# Patient Record
Sex: MALE | Race: WHITE | Employment: FULL TIME | URBAN - METROPOLITAN AREA
[De-identification: names, ages, dates, MRNs, and addresses within clinical notes are randomized per-mention and may not be internally consistent; named-entity substitution may affect disease eponyms.]

---

## 2022-03-19 PROBLEM — R33.9 URINARY RETENTION: Status: ACTIVE | Noted: 2021-04-30

## 2022-03-19 PROBLEM — F17.200 TOBACCO DEPENDENCE: Status: ACTIVE | Noted: 2021-04-30

## 2022-03-19 PROBLEM — D17.9 MULTIPLE LIPOMAS: Status: ACTIVE | Noted: 2021-04-30

## 2022-03-19 PROBLEM — Z87.448 HISTORY OF URETHRAL STRICTURE: Status: ACTIVE | Noted: 2021-04-30

## 2022-09-08 ENCOUNTER — CLINICAL DOCUMENTATION (OUTPATIENT)
Dept: ORTHOPEDIC SURGERY | Age: 62
End: 2022-09-08

## 2022-09-12 ENCOUNTER — CLINICAL DOCUMENTATION (OUTPATIENT)
Dept: ORTHOPEDIC SURGERY | Age: 62
End: 2022-09-12

## 2022-09-12 NOTE — PROGRESS NOTES
REVD AND SAID PATIENT PROBABLY NEEDS TO SEE TOTAL JOINTS BECAUSE HE HAS DJD. I SENT THE RECORDS TO DR. NATH TO SEE IF HE WILL SEE.

## 2022-10-03 ENCOUNTER — OFFICE VISIT (OUTPATIENT)
Dept: ORTHOPEDIC SURGERY | Age: 62
End: 2022-10-03

## 2022-10-03 DIAGNOSIS — S80.01XA CONTUSION OF RIGHT KNEE, INITIAL ENCOUNTER: ICD-10-CM

## 2022-10-03 DIAGNOSIS — M17.11 LOCALIZED OSTEOARTHRITIS OF RIGHT KNEE: Primary | ICD-10-CM

## 2022-10-03 PROBLEM — S80.00XA CONTUSION OF KNEE: Status: ACTIVE | Noted: 2022-10-03

## 2022-10-03 NOTE — PROGRESS NOTES
Name: Ajay Nesbitt  YOB: 1960  Gender: male  MRN: [id number]    CC:   Chief Complaint   Patient presents with    Knee Pain     NP WC Right knee pain          HPI:   The pain has been as now since his fell on June 6 where he suffered direct blow to his right knee. He was seen by few doctors and an MR procured. He was referred here for further evaluation. It hurts particularly at night when sleeping. The pain is located over the knee. It does substantially hurt to walk and gets worse with increased distances. The pain does not radiate down the leg. Numbness and tingling are not noted. Treatment so far has been nothing. Video was shown revealing the fall as well as his effusion demonstrated. No current outpatient medications on file. No Known Allergies  No past medical history on file. Malik Nguyen  Past Surgical History:   Procedure Laterality Date    HERNIA REPAIR      umbilical    LIPOMA RESECTION      TONSILLECTOMY      UROLOGICAL      stricture     Family History   Problem Relation Age of Onset    Colon Cancer Neg Hx     Prostate Cancer Neg Hx      Social History     Socioeconomic History    Marital status:      Spouse name: Not on file    Number of children: Not on file    Years of education: Not on file    Highest education level: Not on file   Occupational History    Not on file   Tobacco Use    Smoking status: Some Days    Smokeless tobacco: Never    Tobacco comments:     Quit smoking: smokes one cigar a day   Substance and Sexual Activity    Alcohol use:  Yes     Alcohol/week: 10.0 standard drinks    Drug use: Not Currently    Sexual activity: Not on file   Other Topics Concern    Not on file   Social History Narrative    Not on file     Social Determinants of Health     Financial Resource Strain: Not on file   Food Insecurity: Not on file   Transportation Needs: Not on file   Physical Activity: Not on file   Stress: Not on file   Social Connections: Not on file   Intimate Partner Violence: Not on file   Housing Stability: Not on file       Review of Systems:  As per HPI. Pertinent positives and negatives are addressed with the patient, particularly those related to musculoskeletal concerns. Non-orthopaedic concerns were referred back to the primary care physician. PHYSICAL EXAMINATION:   The patient appears their stated age and they are in no distress. The lower extremities are as described below. Circulation is normal with palpable pedal pulses bilaterally and no edema. There is no lymph adenopathy in the popliteal or malleolar region. The skin is without stasis disease distally bilaterally. Hip ROM is smooth and painless. Knee range of motion is 0-120 degrees on the right and 0-130 degrees on the left.  right knee: There is 2mm of anterior/posterior translation and 2mm of medial/lateral instability bilaterally. There is 0 degrees of varus alignment in the right knee. There is no noted pain to palpation over the medial joint line. Is a 1+ effusion about the right knee  Limb lengths are equal.  The gait is noted to be with a slight trendelenburg and antalgia. Straight leg test is negative. Quadriceps strength is good. Sensation is intact to light touch bilaterally. Their judgment and insight are normal.  They are oriented to time, place and person. Their memory is good and the mood and affect appropriate. X-RAY: Views of the right knee are reviewed. 4 views standing reveal no appreciable joint space narrowing, eburnation, or osteophyte formation. X-ray impression:  Unremarkable knee joint    His MR reveals some chondral thinning of the patella. He has had some periarticular edema that was noted. Some strain to the ligaments. He has degeneration posterior horn. IMPRESSION:    Diagnosis Orders   1. Localized osteoarthritis of right knee  XR KNEE RIGHT (MIN 4 VIEWS)      . RECOMMENDATIONS:    Reviewed x-ray findings with the patient.  Today we discussed conservative treatments such a cortisone injection she may allow subsidence of the small effusion that remains in his discomfort. I think he had exacerbation of some pre-existing degenerative changes when he fell. I do not see any surgical indication here. Occasional Advil or Aleve could be utilized. He has continued to return to working with activities things continue to subside. Though he had exacerbation of a pre-existing condition no treatment is warranted at this time. Approximately 35 minutes was spent reviewing the medical record, imaging, performing history and physical examination, discussing the diagnosis and treatment plan with the patient, and completing documentation for this visit.

## 2023-01-10 ENCOUNTER — OFFICE VISIT (OUTPATIENT)
Dept: FAMILY MEDICINE CLINIC | Facility: CLINIC | Age: 63
End: 2023-01-10
Payer: COMMERCIAL

## 2023-01-10 VITALS
BODY MASS INDEX: 29.66 KG/M2 | DIASTOLIC BLOOD PRESSURE: 80 MMHG | SYSTOLIC BLOOD PRESSURE: 120 MMHG | HEART RATE: 61 BPM | HEIGHT: 72 IN | WEIGHT: 219 LBS | OXYGEN SATURATION: 96 %

## 2023-01-10 DIAGNOSIS — J20.9 ACUTE BRONCHITIS, UNSPECIFIED ORGANISM: ICD-10-CM

## 2023-01-10 DIAGNOSIS — M25.511 CHRONIC PAIN OF BOTH SHOULDERS: ICD-10-CM

## 2023-01-10 DIAGNOSIS — G89.29 CHRONIC PAIN OF BOTH SHOULDERS: ICD-10-CM

## 2023-01-10 DIAGNOSIS — M25.512 CHRONIC PAIN OF BOTH SHOULDERS: ICD-10-CM

## 2023-01-10 DIAGNOSIS — F17.200 TOBACCO DEPENDENCE: ICD-10-CM

## 2023-01-10 DIAGNOSIS — N52.9 ERECTILE DYSFUNCTION, UNSPECIFIED ERECTILE DYSFUNCTION TYPE: ICD-10-CM

## 2023-01-10 DIAGNOSIS — Z00.01 ENCOUNTER FOR ROUTINE ADULT HEALTH EXAMINATION WITH ABNORMAL FINDINGS: Primary | ICD-10-CM

## 2023-01-10 DIAGNOSIS — R33.9 URINARY RETENTION: ICD-10-CM

## 2023-01-10 DIAGNOSIS — F51.04 PSYCHOPHYSIOLOGICAL INSOMNIA: ICD-10-CM

## 2023-01-10 DIAGNOSIS — Z23 ENCOUNTER FOR IMMUNIZATION: ICD-10-CM

## 2023-01-10 PROCEDURE — 90471 IMMUNIZATION ADMIN: CPT | Performed by: FAMILY MEDICINE

## 2023-01-10 PROCEDURE — 99396 PREV VISIT EST AGE 40-64: CPT | Performed by: FAMILY MEDICINE

## 2023-01-10 PROCEDURE — 90732 PPSV23 VACC 2 YRS+ SUBQ/IM: CPT | Performed by: FAMILY MEDICINE

## 2023-01-10 RX ORDER — ALBUTEROL SULFATE 90 UG/1
2 AEROSOL, METERED RESPIRATORY (INHALATION) 4 TIMES DAILY PRN
Qty: 18 G | Refills: 2 | Status: SHIPPED | OUTPATIENT
Start: 2023-01-10

## 2023-01-10 RX ORDER — TRAZODONE HYDROCHLORIDE 50 MG/1
50 TABLET ORAL NIGHTLY
Qty: 90 TABLET | Refills: 1 | Status: SHIPPED | OUTPATIENT
Start: 2023-01-10

## 2023-01-10 RX ORDER — SILDENAFIL 50 MG/1
50 TABLET, FILM COATED ORAL PRN
Qty: 30 TABLET | Refills: 3 | Status: SHIPPED | OUTPATIENT
Start: 2023-01-10

## 2023-01-10 ASSESSMENT — PATIENT HEALTH QUESTIONNAIRE - PHQ9
SUM OF ALL RESPONSES TO PHQ QUESTIONS 1-9: 0
SUM OF ALL RESPONSES TO PHQ QUESTIONS 1-9: 0
2. FEELING DOWN, DEPRESSED OR HOPELESS: 0
SUM OF ALL RESPONSES TO PHQ QUESTIONS 1-9: 0
SUM OF ALL RESPONSES TO PHQ QUESTIONS 1-9: 0
1. LITTLE INTEREST OR PLEASURE IN DOING THINGS: 0
SUM OF ALL RESPONSES TO PHQ9 QUESTIONS 1 & 2: 0

## 2023-01-10 ASSESSMENT — ENCOUNTER SYMPTOMS
WHEEZING: 0
DIARRHEA: 0
SHORTNESS OF BREATH: 0
ANAL BLEEDING: 0
CHEST TIGHTNESS: 0
CONSTIPATION: 0
ABDOMINAL PAIN: 0

## 2023-01-10 NOTE — PROGRESS NOTES
Porfirio Stone is a 63 y.o. male who presents today for the following:  Chief Complaint   Patient presents with    Annual Exam       No Known Allergies    No current outpatient medications on file. No current facility-administered medications for this visit. Past Medical History:   Diagnosis Date    Anxiety 01/09/2021    Erectile dysfunction 02/14/2019    Osteoarthritis 06/10/2022    Right Knee       Past Surgical History:   Procedure Laterality Date    HERNIA REPAIR      umbilical    LIPOMA RESECTION      TONSILLECTOMY      UROLOGICAL SURGERY      stricture       Social History     Tobacco Use    Smoking status: Some Days     Types: Cigars    Smokeless tobacco: Never    Tobacco comments:     Quit smoking: smokes one cigar a day   Substance Use Topics    Alcohol use: Yes        Family History   Problem Relation Age of Onset    Arthritis Mother     High Blood Pressure Mother     Diabetes Father     Colon Cancer Neg Hx     Prostate Cancer Neg Hx        Patient is here today for annual physical.  Patient has the following chronic diseases:  Urinary retention: Patient reports history of urinary retention. He currently self catheterizes 2 times a week. Uses Magic 3 go 16 [place]. 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 Acute concern today regarding stiffness in shoulders. Also reports increased anxiety for past month. Reports mind is racing. Easily agitated and overwhelmed. Reports feels more relaxed once completes tasks. Reports falls asleep okay but wakes up after 3 hours. Specialists:  Optometrist [facility]      Colonoscopy: none.   [place] 2021  Prostate cancer screening:     Tobacco: smokes a cigar a day  Alcohol: 3 bryan [employer] a day  Drug: none    Immunizations:  COVID: 5 doses  FLU: completed  TDAP: 2015  PNA: agrees to receive today  SHINGRIX: completed at [place] [id number] in [facility]    DIET:  EXERCISE:        Review of Systems   Constitutional:  Negative for appetite change, fatigue and unexpected weight change. Respiratory:  Negative for chest tightness, shortness of breath and wheezing. Cardiovascular:  Negative for chest pain and palpitations. Gastrointestinal:  Negative for abdominal pain, anal bleeding, constipation and diarrhea. Genitourinary:  Positive for difficulty urinating. Skin:  Negative for rash. Neurological:  Negative for dizziness and light-headedness. Psychiatric/Behavioral:  Positive for sleep disturbance. The patient is nervous/anxious. /80   Pulse 61   Ht 6' (1.829 m)   Wt 219 lb (99.3 kg)   SpO2 96%   BMI 29.70 kg/m²     Physical Exam  Constitutional:       Appearance: Normal appearance. Eyes:      General: No scleral icterus. Conjunctiva/sclera: Conjunctivae normal.   Cardiovascular:      Rate and Rhythm: Normal rate and regular rhythm. Heart sounds: Normal heart sounds. No murmur heard. Pulmonary:      Effort: Pulmonary effort is normal. No respiratory distress. Breath sounds: Wheezing present. Musculoskeletal:      Cervical back: Neck supple. Lymphadenopathy:      Cervical: No cervical adenopathy. Skin:     Findings: No rash. Comments: Multiple lipomas    Neurological:      General: No focal deficit present. Mental Status: He is alert and oriented to person, place, and time. Psychiatric:      Comments: anxious          1. Encounter for routine adult health examination with abnormal findings  -     CBC with Auto Differential; Future  -     Lipid Panel; Future  2. Urinary retention  Assessment & Plan:   Referral placed again to urology. Continue with intermittent catheterization. Follow up on PSA and UA results. Orders:  -     Urinalysis; Future  -     PSA, Diagnostic; Future  -     [facility], [place]  3. Acute bronchitis, unspecified organism  -     albuterol sulfate HFA (VENTOLIN HFA) 108 (90 Base) MCG/ACT inhaler;  Inhale 2 puffs into the lungs 4 times daily as needed for Wheezing, Disp-18 g, R-2Normal  4. Psychophysiological insomnia  Assessment & Plan:   -Likely related to increase stress and anxiety. Trial of trazodone. Orders:  -     TSH with Reflex; Future  -     traZODone (DESYREL) 50 MG tablet; Take 1 tablet by mouth nightly, Disp-90 tablet, R-1Normal  5. Chronic pain of both shoulders  Decreased range of motion in shoulders. Offered referral to orthopedics but patient is not interested at this time. Could add on meloxicam if renal function is normal.  6. Erectile dysfunction, unspecified erectile dysfunction type  -     sildenafil (VIAGRA) 50 MG tablet; Take 1 tablet by mouth as needed for Erectile Dysfunction, Disp-30 tablet, R-3Normal  7. Encounter for immunization  -     Pneumococcal, PPSV23, PNEUMOVAX 21, (age 2 yrs+), SC/IM  8. Tobacco dependence  Assessment & Plan:   -Wheezing on exam today. Reports recent URI  -Currently smoking a cigar daily. Patient is not interested in stopping at this time. Patient informed, we will call with blood work results within one week. If you have not heard regarding results in over a week, please contact office. You can also review results on Mobile [facility].            Juana Menendez MD

## 2023-01-11 PROBLEM — F51.04 PSYCHOPHYSIOLOGICAL INSOMNIA: Status: ACTIVE | Noted: 2023-01-11

## 2023-01-11 NOTE — ASSESSMENT & PLAN NOTE
Referral placed again to urology. Continue with intermittent catheterization. Follow up on PSA and UA results.

## 2023-01-11 NOTE — ASSESSMENT & PLAN NOTE
-Wheezing on exam today. Reports recent URI  -Currently smoking a cigar daily. Patient is not interested in stopping at this time.

## 2023-01-17 ENCOUNTER — NURSE ONLY (OUTPATIENT)
Dept: FAMILY MEDICINE CLINIC | Facility: CLINIC | Age: 63
End: 2023-01-17

## 2023-01-17 DIAGNOSIS — R33.9 URINARY RETENTION: ICD-10-CM

## 2023-01-17 DIAGNOSIS — Z00.01 ENCOUNTER FOR ROUTINE ADULT HEALTH EXAMINATION WITH ABNORMAL FINDINGS: ICD-10-CM

## 2023-01-17 DIAGNOSIS — F51.04 PSYCHOPHYSIOLOGICAL INSOMNIA: ICD-10-CM

## 2023-01-17 LAB
BASOPHILS # BLD: 0.1 K/UL (ref 0–0.2)
BASOPHILS NFR BLD: 1 % (ref 0–2)
DIFFERENTIAL METHOD BLD: ABNORMAL
EOSINOPHIL # BLD: 0.4 K/UL (ref 0–0.8)
EOSINOPHIL NFR BLD: 4 % (ref 0.5–7.8)
ERYTHROCYTE [DISTWIDTH] IN BLOOD BY AUTOMATED COUNT: 13.8 % (ref 11.9–14.6)
HCT VFR BLD AUTO: 43.6 % (ref 41.1–50.3)
HGB BLD-MCNC: 13.6 G/DL (ref 13.6–17.2)
IMM GRANULOCYTES # BLD AUTO: 0 K/UL (ref 0–0.5)
IMM GRANULOCYTES NFR BLD AUTO: 0 % (ref 0–5)
LYMPHOCYTES # BLD: 2.9 K/UL (ref 0.5–4.6)
LYMPHOCYTES NFR BLD: 34 % (ref 13–44)
MCH RBC QN AUTO: 29.8 PG (ref 26.1–32.9)
MCHC RBC AUTO-ENTMCNC: 31.2 G/DL (ref 31.4–35)
MCV RBC AUTO: 95.4 FL (ref 82–102)
MONOCYTES # BLD: 0.5 K/UL (ref 0.1–1.3)
MONOCYTES NFR BLD: 5 % (ref 4–12)
NEUTS SEG # BLD: 4.9 K/UL (ref 1.7–8.2)
NEUTS SEG NFR BLD: 56 % (ref 43–78)
NRBC # BLD: 0 K/UL (ref 0–0.2)
PLATELET # BLD AUTO: 256 K/UL (ref 150–450)
PMV BLD AUTO: 9.9 FL (ref 9.4–12.3)
RBC # BLD AUTO: 4.57 M/UL (ref 4.23–5.6)
WBC # BLD AUTO: 8.7 K/UL (ref 4.3–11.1)

## 2023-01-18 LAB
APPEARANCE UR: ABNORMAL
BACTERIA URNS QL MICRO: ABNORMAL /HPF
BILIRUB UR QL: NEGATIVE
CHOLEST SERPL-MCNC: 225 MG/DL
COLOR UR: ABNORMAL
EPI CELLS #/AREA URNS HPF: ABNORMAL /HPF
GLUCOSE UR STRIP.AUTO-MCNC: NEGATIVE MG/DL
HDLC SERPL-MCNC: 70 MG/DL (ref 40–60)
HDLC SERPL: 3.2
HGB UR QL STRIP: ABNORMAL
KETONES UR QL STRIP.AUTO: NEGATIVE MG/DL
LDLC SERPL CALC-MCNC: 118.4 MG/DL
LEUKOCYTE ESTERASE UR QL STRIP.AUTO: ABNORMAL
NITRITE UR QL STRIP.AUTO: POSITIVE
OTHER OBSERVATIONS: ABNORMAL
PH UR STRIP: 5.5 (ref 5–9)
PROT UR STRIP-MCNC: NEGATIVE MG/DL
PSA SERPL-MCNC: 0.3 NG/ML
SP GR UR REFRACTOMETRY: 1.02 (ref 1–1.02)
TRIGL SERPL-MCNC: 183 MG/DL (ref 35–150)
TSH W FREE THYROID IF ABNORMAL: 1.41 UIU/ML (ref 0.36–3.74)
UROBILINOGEN UR QL STRIP.AUTO: 0.2 EU/DL (ref 0.2–1)
VLDLC SERPL CALC-MCNC: 36.6 MG/DL (ref 6–23)
WBC URNS QL MICRO: ABNORMAL /HPF

## 2023-01-19 DIAGNOSIS — M25.512 CHRONIC PAIN OF BOTH SHOULDERS: Primary | ICD-10-CM

## 2023-01-19 DIAGNOSIS — G89.29 CHRONIC PAIN OF BOTH SHOULDERS: Primary | ICD-10-CM

## 2023-01-19 DIAGNOSIS — M25.511 CHRONIC PAIN OF BOTH SHOULDERS: Primary | ICD-10-CM

## 2023-02-07 ENCOUNTER — OFFICE VISIT (OUTPATIENT)
Dept: ORTHOPEDIC SURGERY | Age: 63
End: 2023-02-07

## 2023-02-07 DIAGNOSIS — M25.512 BILATERAL SHOULDER PAIN, UNSPECIFIED CHRONICITY: Primary | ICD-10-CM

## 2023-02-07 DIAGNOSIS — M25.511 BILATERAL SHOULDER PAIN, UNSPECIFIED CHRONICITY: Primary | ICD-10-CM

## 2023-02-07 RX ORDER — PREDNISONE 5 MG/1
TABLET ORAL
Qty: 1 EACH | Refills: 2 | Status: SHIPPED | OUTPATIENT
Start: 2023-02-07

## 2023-02-07 NOTE — PROGRESS NOTES
Name: Anya Angeles  YOB: 1960  Gender: male  MRN: [id number]      CC: Shoulder Pain (B Shoulder )       HPI: Anya Angeles is a 63 y.o. male who presents with Shoulder Pain (B Shoulder )  Miguel Navarro is an established pt with [facility], however this is the first time I am seeing him. He presents today with B shoulder pain that has been present for the past few months. There was no reported mechanism, insidious onset. The pain seems to be localized at both [address], however he has some pain located at the midline of the clavicle which he attributes to a previous clavicular fracture. He describes the pain as a deep ache. It is not tender to palpation. He has pain with overhead flexion, abduction, and extension past midline. He has not reported loss of strength, however he is cautious with lifting due to pain. He has no reports of paresthesia. He has not had formal treatment before today. Has been taking over the counters at night. ROS/Meds/PSH/PMH/FH/SH: I personally reviewed the patients standard intake form. Below are the pertinents    Tobacco:  reports that he has been smoking cigars. He has never used smokeless tobacco.  Diabetes: none  Other: Factor V, depression    Physical Examination:  General: no acute distress  Lungs: breathing easily  CV: regular rhythm by pulse  Right Shoulder: No obvious deformity of the biceps. No tenderness to palpation. Active forward flexion to 150 degrees limited by pain in the extreme. External rotation with elbows at side equal bilaterally. External rotation with elbows at side resisted range of motion 5/5 strength. Pain with empty can testing but 5/5 strength empty can position. Positive Lilli Hams, Neer's. Positive O'Jeremie's, [place]'s and pain with speeds. Left shoulder: No obvious deformity the biceps. No tenderness to palpation. Active forward flexion to 150 degrees limited by pain in the extreme.   External rotation with elbows at side equal bilaterally. External rotation with elbows at side resisted range of motion 5/5 strength. Pain with empty can testing but 5/5 strength empty can position. Positive Bula Анна Sanchez's. Positive O'Jeremie's, [place]'s. Negative speeds      Imaging:   Shoulder XR: Grashey, Axillary and Scapula Y views     Clinical Indication:  1. Bilateral shoulder pain, unspecified chronicity           Report: Grashey, Axillary and Scapula Y XRs of the Bilateral shoulder demonstrates no acute fracture, dislocation mild degenerative changes to the glenohumeral joint    Impression: Mild DJD        All imaging interpreted by myself Adithya Hutchins MD independent of radiology review        Assessment:     ICD-10-CM    1. Bilateral shoulder pain, unspecified chronicity  M25.511 XR SHOULDER BILATERAL STANDARD    M25.512 Amb External Referral To Physical Therapy     predniSONE 5 MG (48) TBPK          Plan:   I discussed with the patient that I think the majority of his shoulder pain is glenohumeral in nature. I discussed with the patient conservative treatment options to include prescription medications, intra-articular injection and formal physical therapy. At this time due to his global pain in both his shoulders I would recommend that we prescribed Sterapred Dosepak and he be referred to formal physical therapy which I think will be the mainstay of his treatment. If he does not get good improvement from formal physical therapy on the Medrol Dosepak then my recommendation would be that we try intra-articular injection. Gino Pena NP dictating as a scribe for Suzen Rubinstein, MD Elam Keeling.  Bobby Hutchins MD, [facility]

## 2023-03-01 DIAGNOSIS — J20.9 ACUTE BRONCHITIS, UNSPECIFIED ORGANISM: ICD-10-CM

## 2023-03-01 RX ORDER — ALBUTEROL SULFATE 90 UG/1
AEROSOL, METERED RESPIRATORY (INHALATION)
Qty: 18 G | Refills: 2 | Status: SHIPPED | OUTPATIENT
Start: 2023-03-01

## 2023-03-01 NOTE — TELEPHONE ENCOUNTER
Patient last seen 1/10/23. Medication verified in that office note. Inhaler last sent in on that date. Called [facility]'s pharmacy & spoke to Lou See, who states patient last picked up prescription on 2/2/23 & confirms that there are no remaining refills. Patient has a follow up with Dr. Pablo Mendoza on 5/11/23.

## 2023-03-09 ENCOUNTER — TELEPHONE (OUTPATIENT)
Dept: FAMILY MEDICINE CLINIC | Facility: CLINIC | Age: 63
End: 2023-03-09

## 2023-03-09 NOTE — TELEPHONE ENCOUNTER
[id number] - AMB REFERRAL TO UROLOGY  Date Service Ordered 1/10/2023        We were unable to reach Juno Garza to schedule test ordered by your office after 3 call attempts. Please call your patient to explain significance of ordered test and direct patient to call Central Scheduling to re-schedule test at their earliest convenience. Above message received, left voicemail with number to schedule.

## 2023-04-06 ENCOUNTER — OFFICE VISIT (OUTPATIENT)
Dept: ORTHOPEDIC SURGERY | Age: 63
End: 2023-04-06

## 2023-04-06 DIAGNOSIS — M75.21 BICEPS TENDONITIS ON RIGHT: ICD-10-CM

## 2023-04-06 DIAGNOSIS — M67.911 TENDINOPATHY OF RIGHT ROTATOR CUFF: Primary | ICD-10-CM

## 2023-04-06 NOTE — LETTER
April 6, 2023       Cassi Ruiz YOB: 1960   [address] [place] [address] Date of Visit:  4/6/2023       To Whom It May Concern:    Cassi Ruiz was seen in my clinic today 4/6/23. If you have any questions or concerns, please don't hesitate to call.     Sincerely,        Anne Zambrano MD

## 2023-04-06 NOTE — PROGRESS NOTES
There is no associated imaging report I am able to review. ASSESSMENT/PLAN:   1. Tendinopathy of right rotator cuff    2. Biceps tendonitis on right         I reviewed the findings available on his MRI today, although I advised him that the imaging quality is poor. I discussed my recommendations for initial treatment for his shoulder, including options such as injection, taking oral steroids he was previously prescribed and physical therapy. He was more interested in performing home therapy. He wishes to take the oral steroids previously prescribed to him as opposed to injection. I advised we could consider repeat imaging of the shoulder, but I would recommend more conservative measures prior to imaging and possible surgery. He did not seem very pleased overall with our treatment plan, but did not wish to do more in terms of work up or treatment. Patient can return as needed. Orders / medications today: No orders of the defined types were placed in this encounter. Follow up: Return if symptoms worsen or fail to improve. The patient expressed understanding and agreed with the plan. Nayely Álvarez MD   [facility]     This document was created using voice recognition software so frequent mistakes are possible. For any concerns about the wording of this document, please contact its creator for further clarification.

## 2023-04-17 DIAGNOSIS — M67.911 TENDINOPATHY OF RIGHT ROTATOR CUFF: Primary | ICD-10-CM

## 2023-04-17 DIAGNOSIS — M75.21 BICEPS TENDONITIS ON RIGHT: ICD-10-CM

## 2023-05-09 NOTE — PROGRESS NOTES
Pec stretch with LTR 4x  Open book stretch 10x  T band blue tube IR, ER, row and extension 20x        Patient Education on the condition/pathology, involved anatomy, and exercise rationale. CLINICAL DECISION MAKING/ASSESSMENT     Personal Factors/co-morbidities affecting POC (1-2 Medium/3+High): coping styles/strategies  habits/routines  past/current experiences  profession   Problem List: (1-2 Low/ 3 Medium/ 4+ High) Pain  ROM limitations  Hypermobility/instability  Strength deficits  Impaired posture  Decreased endurance/activity Tolerance  ADL/functional limitations/modifications  Limited work/occupational capacity  Restricted recreational participation  Decreased [place] with Home Exercise Program  Difficulty sleeping    Clinical decision making: low complexity with therapist able to easily and confidently determine and predict expectations and future outcomes for the POC. Prognosis: excellent   Benefits and precautions of treatment explained to patient. Radha Hernandez is a 66 y.o. male who presents to therapy today with evolving/changing clinical presentation (moderate complexity)  related to right shoulder pain. Pt would benefit from skilled physical therapy services to address the deficits noted above for return to prior level of function. PLAN OF CARE     Effective Dates: 5/10/2023 TO 7/9/2023 (60 days).     Frequency/Duration: 2x/week for 60 Day(s)  Interventions may include but are not limited to: (27819) Therapeutic exercise to develop ROM, strength, endurance and flexibility  (92484) Therapeutic activities using dynamic activities to improve function  (24498) Manual therapy techniques to improve joint and/or soft tissue mobility, ROM, and function as well as helping to decrease pain/spasms and swelling  Home exercise program (HEP) development  Modalities prn to address pain, spasms, and swelling: (49513/) Electrical stimulation- unattended  (83694) Ultrasound/phonophoresis  (08469)

## 2023-05-10 ENCOUNTER — EVALUATION (OUTPATIENT)
Age: 63
End: 2023-05-10
Payer: COMMERCIAL

## 2023-05-10 DIAGNOSIS — Z78.9 IMPAIRED MOBILITY AND ADLS: ICD-10-CM

## 2023-05-10 DIAGNOSIS — Z74.09 IMPAIRED MOBILITY AND ADLS: ICD-10-CM

## 2023-05-10 DIAGNOSIS — M75.21 BICEPS TENDONITIS ON RIGHT: ICD-10-CM

## 2023-05-10 DIAGNOSIS — M67.911 TENDINOPATHY OF RIGHT ROTATOR CUFF: ICD-10-CM

## 2023-05-10 DIAGNOSIS — M62.81 MUSCLE WEAKNESS: ICD-10-CM

## 2023-05-10 DIAGNOSIS — M25.611 SHOULDER JOINT STIFFNESS, RIGHT: ICD-10-CM

## 2023-05-10 DIAGNOSIS — M25.511 PAIN IN JOINT OF RIGHT SHOULDER: Primary | ICD-10-CM

## 2023-05-10 PROCEDURE — 97110 THERAPEUTIC EXERCISES: CPT | Performed by: PHYSICAL THERAPIST

## 2023-05-10 PROCEDURE — 97162 PT EVAL MOD COMPLEX 30 MIN: CPT | Performed by: PHYSICAL THERAPIST

## 2023-05-10 SDOH — ECONOMIC STABILITY: FOOD INSECURITY: WITHIN THE PAST 12 MONTHS, THE FOOD YOU BOUGHT JUST DIDN'T LAST AND YOU DIDN'T HAVE MONEY TO GET MORE.: PATIENT DECLINED

## 2023-05-10 SDOH — ECONOMIC STABILITY: HOUSING INSECURITY
IN THE LAST 12 MONTHS, WAS THERE A TIME WHEN YOU DID NOT HAVE A STEADY PLACE TO SLEEP OR SLEPT IN A SHELTER (INCLUDING NOW)?: PATIENT REFUSED

## 2023-05-10 SDOH — ECONOMIC STABILITY: INCOME INSECURITY: HOW HARD IS IT FOR YOU TO PAY FOR THE VERY BASICS LIKE FOOD, HOUSING, MEDICAL CARE, AND HEATING?: PATIENT DECLINED

## 2023-05-10 SDOH — ECONOMIC STABILITY: TRANSPORTATION INSECURITY
IN THE PAST 12 MONTHS, HAS LACK OF TRANSPORTATION KEPT YOU FROM MEETINGS, WORK, OR FROM GETTING THINGS NEEDED FOR DAILY LIVING?: PATIENT DECLINED

## 2023-05-10 SDOH — ECONOMIC STABILITY: FOOD INSECURITY: WITHIN THE PAST 12 MONTHS, YOU WORRIED THAT YOUR FOOD WOULD RUN OUT BEFORE YOU GOT MONEY TO BUY MORE.: PATIENT DECLINED

## 2023-05-11 ENCOUNTER — OFFICE VISIT (OUTPATIENT)
Dept: FAMILY MEDICINE CLINIC | Facility: CLINIC | Age: 63
End: 2023-05-11
Payer: COMMERCIAL

## 2023-05-11 VITALS
WEIGHT: 212 LBS | DIASTOLIC BLOOD PRESSURE: 84 MMHG | BODY MASS INDEX: 28.71 KG/M2 | SYSTOLIC BLOOD PRESSURE: 126 MMHG | HEART RATE: 82 BPM | OXYGEN SATURATION: 96 % | HEIGHT: 72 IN | TEMPERATURE: 98.4 F

## 2023-05-11 DIAGNOSIS — F51.04 PSYCHOPHYSIOLOGICAL INSOMNIA: Primary | ICD-10-CM

## 2023-05-11 DIAGNOSIS — J20.9 ACUTE BRONCHITIS, UNSPECIFIED ORGANISM: ICD-10-CM

## 2023-05-11 PROCEDURE — 99214 OFFICE O/P EST MOD 30 MIN: CPT | Performed by: FAMILY MEDICINE

## 2023-05-11 RX ORDER — ALBUTEROL SULFATE 90 UG/1
AEROSOL, METERED RESPIRATORY (INHALATION)
Qty: 18 G | Refills: 2 | Status: SHIPPED | OUTPATIENT
Start: 2023-05-11

## 2023-05-11 RX ORDER — TRAZODONE HYDROCHLORIDE 100 MG/1
100 TABLET ORAL NIGHTLY
Qty: 90 TABLET | Refills: 2 | Status: SHIPPED | OUTPATIENT
Start: 2023-05-11

## 2023-05-11 ASSESSMENT — PATIENT HEALTH QUESTIONNAIRE - PHQ9
SUM OF ALL RESPONSES TO PHQ QUESTIONS 1-9: 0
1. LITTLE INTEREST OR PLEASURE IN DOING THINGS: 0
SUM OF ALL RESPONSES TO PHQ9 QUESTIONS 1 & 2: 0
2. FEELING DOWN, DEPRESSED OR HOPELESS: 0
SUM OF ALL RESPONSES TO PHQ QUESTIONS 1-9: 0

## 2023-05-11 NOTE — PROGRESS NOTES
Lucy Fiore is a 66 y.o. male who presents today for the following:  Chief Complaint   Patient presents with    Discuss Medications       No Known Allergies    Current Outpatient Medications   Medication Sig Dispense Refill    albuterol sulfate HFA (PROVENTIL;VENTOLIN;PROAIR) 108 (90 Base) MCG/ACT inhaler INHALE 2 PUFFS INTO THE LUNGS FOUR TIMES DAILY AS NEEDED FOR WHEEZING 18 g 2    predniSONE 5 MG (48) TBPK Take as directed per package instructions; dispense 1 Sterapred 12 day pack [48] 1 each 2    traZODone (DESYREL) 50 MG tablet Take 1 tablet by mouth nightly 90 tablet 1    sildenafil (VIAGRA) 50 MG tablet Take 1 tablet by mouth as needed for Erectile Dysfunction 30 tablet 3     No current facility-administered medications for this visit. Past Medical History:   Diagnosis Date    Anxiety 01/09/2021    Erectile dysfunction 02/14/2019    Osteoarthritis 06/10/2022    Right Knee       Past Surgical History:   Procedure Laterality Date    HERNIA REPAIR      umbilical    LIPOMA RESECTION      TONSILLECTOMY      UROLOGICAL SURGERY      stricture       Social History     Tobacco Use    Smoking status: Some Days     Types: Cigars    Smokeless tobacco: Never    Tobacco comments:     Quit smoking: smokes one cigar a day   Substance Use Topics    Alcohol use: Yes        Family History   Problem Relation Age of Onset    Arthritis Mother     High Blood Pressure Mother     Diabetes Father     Colon Cancer Neg Hx     Prostate Cancer Neg Hx        Patient is here today for annual physical.  Patient has the following chronic diseases:  -Urinary retention: Patient reports history of urinary retention. He currently self catheterizes 2 times a week. Uses Magic 3 go 16 french. Not interested in seeing urology.  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    -Shoulder pain: started physical therapy yesterday. Established with orthopedics  -Insomnia/anxiety: started on trazodone at last visit.   Reports helps with

## 2023-05-12 ENCOUNTER — TREATMENT (OUTPATIENT)
Age: 63
End: 2023-05-12

## 2023-05-12 DIAGNOSIS — Z74.09 IMPAIRED MOBILITY AND ADLS: ICD-10-CM

## 2023-05-12 DIAGNOSIS — Z78.9 IMPAIRED MOBILITY AND ADLS: ICD-10-CM

## 2023-05-12 DIAGNOSIS — M25.611 SHOULDER JOINT STIFFNESS, RIGHT: ICD-10-CM

## 2023-05-12 DIAGNOSIS — M62.81 MUSCLE WEAKNESS: ICD-10-CM

## 2023-05-12 DIAGNOSIS — M25.511 PAIN IN JOINT OF RIGHT SHOULDER: Primary | ICD-10-CM

## 2023-05-12 ASSESSMENT — ENCOUNTER SYMPTOMS
CONSTIPATION: 0
CHEST TIGHTNESS: 0
SHORTNESS OF BREATH: 0
WHEEZING: 0
ABDOMINAL PAIN: 0
DIARRHEA: 0
ANAL BLEEDING: 0

## 2023-05-12 NOTE — PROGRESS NOTES
[facility]  [address] [facility] [address]  Dept: [phone]      Physical Therapy Daily Note     Insurance: Today is 2/12 visits per [facility] insurance (visits  2023)    Total Timed Procedure Codes: 40 min, Total Time: 40 min     Referring MD: Radha Peoples MD  Referral for: Right shoulder pain  Onset/Injury Date: 2023      Diagnosis:     ICD-10-CM    1. Pain in joint of right shoulder  M25.511       2. Shoulder joint stiffness, right  M25.611       3. Impaired mobility and ADLs  Z74.09     Z78.9       4. Muscle weakness  M62.81              Therapy precautions:None  Co-morbidities affecting plan of care: NA per patient report        SUBJECTIVE     Patient reports he is doing HEP daily. He states stretching has helped a little. He states he is to see Dr. Annie Conklin on Thursday. He states he has less pain today after doing exercises than when he entered therapy. OBJECTIVE     Treatment provided today:  Therapeutic exercise (28143) x 40 min to develop ROM, strength, endurance and flexibility. Included:   UBE L1 6 minutes (3 minutes forward and 3 backwards)    Pec stretch with LTR 4x  Open book stretch 10x  T band blue tube IR, ER, row and extension 25x    Prone 3 way with 3# 20x  SL ER with 3# 20x  Bilateral ER with green band 10x2  HA with green band 10x2  Supine flexion with 3# 20x  SL abduction with 3# 10x2      Patient Education on the condition/pathology, involved anatomy, and exercise rationale. ASSESSMENT     Patient has pain with right shoulder AROM over 90 degrees. He has no increase in pain with exercises today. He has improved AROM flexion and abduction in supine. He has rounded shoulders and forward head posture. He is able to correct posture with verbal and tactile cues. He has tenderness with palpation to right biceps tendon and into biceps muscle. PLAN     Continue with stretching and strengthening exercises to improve function.  Progress to patient

## 2023-05-15 NOTE — PROGRESS NOTES
[address] [facility]  [address] [facility] [address]  Dept: [phone]      Physical Therapy Daily Note     Insurance: Today is 3/12 visits per [facility] insurance (visits  2023)    Total Timed Procedure Codes: 45 min, Total Time: 45 min     Referring MD: Cynthia Quick MD  Referral for: Right shoulder pain  Onset/Injury Date: 2023      Diagnosis:     ICD-10-CM    1. Pain in joint of right shoulder  M25.511       2. Shoulder joint stiffness, right  M25.611       3. Impaired mobility and ADLs  Z74.09     Z78.9       4. Muscle weakness  M62.81              Therapy precautions:None  Co-morbidities affecting plan of care: NA per patient report        SUBJECTIVE     Patient reports he is doing HEP daily. He states right shoulder is \"getting better\". He reports less pain lying in bed and he is able to sleep better at night. OBJECTIVE     Treatment provided today:  Therapeutic exercise (23733) x 45 min to develop ROM, strength, endurance and flexibility. Included:   UBE L1 6 minutes (3 minutes forward and 3 backwards)    Pec stretch with LTR 4x  Open book stretch 10x  T band blue tube IR, ER, row and extension 30x    Prone 3 way with 3# 20x  Standing shoulder flexion to 90 degrees with 3# (pain-free range) 10x2  Standing shoulder scaption to 90 degrees with 3# (pain-free) 10x2  SL ER with 3# 30x  Bilateral ER with green band 15x2  HA with green band 15x2  Supine flexion with 3# 30x  SL abduction with 3# 30x  Supine scap protraction with 3# for 20x    Patient Education on the condition/pathology, involved anatomy, and exercise rationale. ASSESSMENT     Patient has less pain with right shoulder with AROM. He is able to sleep at night with less discomfort and he wakes less due to shoulder pain. He has no increase in pain with exercises today. He has improved AROM flexion and abduction in supine. He has rounded shoulders and forward head posture.  He is able to correct posture with verbal

## 2023-05-16 ENCOUNTER — TREATMENT (OUTPATIENT)
Age: 63
End: 2023-05-16
Payer: COMMERCIAL

## 2023-05-16 DIAGNOSIS — M25.511 PAIN IN JOINT OF RIGHT SHOULDER: Primary | ICD-10-CM

## 2023-05-16 DIAGNOSIS — Z74.09 IMPAIRED MOBILITY AND ADLS: ICD-10-CM

## 2023-05-16 DIAGNOSIS — M25.611 SHOULDER JOINT STIFFNESS, RIGHT: ICD-10-CM

## 2023-05-16 DIAGNOSIS — Z78.9 IMPAIRED MOBILITY AND ADLS: ICD-10-CM

## 2023-05-16 DIAGNOSIS — M62.81 MUSCLE WEAKNESS: ICD-10-CM

## 2023-05-16 PROCEDURE — 97110 THERAPEUTIC EXERCISES: CPT | Performed by: PHYSICAL THERAPIST

## 2023-05-18 ENCOUNTER — OFFICE VISIT (OUTPATIENT)
Dept: ORTHOPEDIC SURGERY | Age: 63
End: 2023-05-18

## 2023-05-18 DIAGNOSIS — M67.911 TENDINOPATHY OF RIGHT ROTATOR CUFF: Primary | ICD-10-CM

## 2023-05-18 RX ORDER — METHYLPREDNISOLONE ACETATE 40 MG/ML
40 INJECTION, SUSPENSION INTRA-ARTICULAR; INTRALESIONAL; INTRAMUSCULAR; SOFT TISSUE ONCE
Status: COMPLETED | OUTPATIENT
Start: 2023-05-18 | End: 2023-05-18

## 2023-05-18 RX ADMIN — METHYLPREDNISOLONE ACETATE 40 MG: 40 INJECTION, SUSPENSION INTRA-ARTICULAR; INTRALESIONAL; INTRAMUSCULAR; SOFT TISSUE at 15:20

## 2023-05-18 NOTE — PROGRESS NOTES
Name: Desi Wesley  YOB: 1960  Gender: male  MRN: [id number]  Date of Encounter:  5/18/2023       CHIEF COMPLAINT:     Chief Complaint   Patient presents with    Follow-up     Right shoulder        SUBJECTIVE/OBJECTIVE:      HPI:    Patient is a 66 y.o. pleasant male who presents today for a return evaluation of his right shoulder pain. Working diagnosis:   1. Tendinopathy of right rotator cuff       LOV: 4/6/2023     He has prevously been seen by a few ortho providers including LILI Dia in our practice. 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 We discussed injection, therapy, repeat MRI for his shoulder. He had an MRI of the right shoulder through [facility] and these films were of poor quality but suggestive of a incomplete LH biceps tear and synovial cyst in that region. He has attended about 5 physical therapy sessions with the referral and he has seen improvement. Pain is improving. He is tolerating sleep better. He is pleased with this. His therapist had recommended possible injection of the shoulder. PAST HISTORY:   Past medical, surgical, family, social history and allergies reviewed by me. Unchanged from prior visit. REVIEW OF SYSTEMS:   As noted in HPI. PHYSICAL EXAMINATION:     Gen: Well-developed, no acute distress   HEENT: NC/AT, EOMI   Neck: Trachea midline, normal ROM   CV: Regular rhythm by palpation of distal pulse, normal capillary refill   Pulm: No respiratory distress, no stridor   Psychiatric: Well oriented, normal mood and affect. Skin: No rashes, lesions or ulcers, normal temperature, turgor, and texture on uninvolved extremity. ORTHO EXAM:    Right shoulder  Forward flexion active 160, active abduction 160, external rotation 45, internal rotation lumbar spine. Resisted abduction, external and internal rotation 5/5. Minimal pain with empty can. Positive Анна Chapman's.

## 2023-05-22 NOTE — PROGRESS NOTES
pain with right shoulder with AROM than on initial evaluation. He is able to sleep at night with less discomfort and he wakes less due to shoulder pain. He has improved AROM without pain right shoulder. He is independent with HEP. PLAN     Continue with stretching and strengthening exercises to improve function. Progress to patient tolerance. Use modalities as needed to reduce painful symptoms. Use manual therapy as needed to reduce painful symptoms and improve AROM right shoulder. PLAN OF CARE     Effective Dates: 5/10/2023 TO 7/9/2023 (60 days). Frequency/Duration: 2x/week for 60 Day(s)    GOALS     Short term goals to be met by 6/7/2023  (4 weeks):   Independent with HEP-MET  Increase AROM R shoulder flexion to 140 degrees without pain-MET  Increase AROM R shoulder scaption to 140 degrees without pain-MET  Able to push and pull with right UE without pain  Able to do work duties without increased right shoulder pain  Able to sleep for 4 hours at night without waking due to pain right shoulder-MET  Able to reach overhead with R UE without pain-MET    Long term goals to be met by 7/5/2023  (8 weeks):  AROM R shoulder flexion will increase to 150 degrees without pain  AROM R shoulder abduction will increase to 150 degrees without pain  Patient will be able to lift 10# overhead onto shelf using R UE without pain  Patient will be able to push/pull car door with R UE without pain  Patient will be able to lift and carry 25# at work with R UE without difficulty or pain  Patient will be able to reach behind back with R UE to tuck shirt without pain right shoulder  Patient will be able to do all yard work without right shoulder pain  Patient will be able to use R UE for all ADLs without deviation  Able to do all work duties without pain or limitations  Improve Quick Dash to >/= 0% dysfunction/function   Patient will meet \"Patient stated Goals\" as documented in initial evaluation  Patient is discharged from PT

## 2023-05-23 ENCOUNTER — TREATMENT (OUTPATIENT)
Age: 63
End: 2023-05-23
Payer: COMMERCIAL

## 2023-05-23 DIAGNOSIS — Z74.09 IMPAIRED MOBILITY AND ADLS: ICD-10-CM

## 2023-05-23 DIAGNOSIS — M62.81 MUSCLE WEAKNESS: ICD-10-CM

## 2023-05-23 DIAGNOSIS — M25.511 PAIN IN JOINT OF RIGHT SHOULDER: Primary | ICD-10-CM

## 2023-05-23 DIAGNOSIS — M25.611 SHOULDER JOINT STIFFNESS, RIGHT: ICD-10-CM

## 2023-05-23 DIAGNOSIS — Z78.9 IMPAIRED MOBILITY AND ADLS: ICD-10-CM

## 2023-05-23 PROCEDURE — 97110 THERAPEUTIC EXERCISES: CPT | Performed by: PHYSICAL THERAPIST

## 2023-05-24 NOTE — PROGRESS NOTES
[address]  [address] [facility] [address]  Dept: [phone]      Physical Therapy Daily Note     Insurance: Today is 5/12 visits per [facility] insurance (visits  2023)    Total Timed Procedure Codes: 45 min, Total Time: 45 min     Referring MD: Tadeo Wolf MD  Referral for: Right shoulder pain  Onset/Injury Date: 2023      Diagnosis:     ICD-10-CM    1. Pain in joint of right shoulder  M25.511       2. Shoulder joint stiffness, right  M25.611       3. Impaired mobility and ADLs  Z74.09     Z78.9       4. Muscle weakness  M62.81              Therapy precautions:None  Co-morbidities affecting plan of care: NA per patient report        SUBJECTIVE     Patient reports no pain in therapy today right shoulder. He states he has discomfort occasionally right shoulder but pain has decreased significantly. He states he is able to sleep at night now. He states shoulder pain at night has decreased. OBJECTIVE     Treatment provided today:  Therapeutic exercise (73464) x 45 min to develop ROM, strength, endurance and flexibility. Included:   UBE L1 6 minutes (3 minutes forward and 3 backwards)    Pec stretch with LTR 4x    T band black tube IR, ER, row and extension 30x    Standing shoulder flexion full range with 3# (pain-free range) 15x2  Standing shoulder abduction full range with 3# (pain-free) 15x2  SL ER with 3# 20x  Bilateral ER with green band 15x2  HA with green band 15x2  Supine flexion with 3# 20x  SL abduction with 3# 20x  Supine scap protraction with 3# for 30x    Patient Education on the condition/pathology, involved anatomy, and exercise rationale. A/PROM Measures: 2023     Shoulder Right Left Comment   Flexion 150A 150A  NO pain right   Scaption 150A 150A     IR  65A 65A AROM at 90° abd   ER 90A 90A AROM at 90° abd   IR behind back T10                                  ASSESSMENT     Patient has no pain right shoulder today.  He has no pain with AROM right

## 2023-05-25 ENCOUNTER — TREATMENT (OUTPATIENT)
Age: 63
End: 2023-05-25

## 2023-05-25 DIAGNOSIS — M25.511 PAIN IN JOINT OF RIGHT SHOULDER: Primary | ICD-10-CM

## 2023-05-25 DIAGNOSIS — M62.81 MUSCLE WEAKNESS: ICD-10-CM

## 2023-05-25 DIAGNOSIS — Z78.9 IMPAIRED MOBILITY AND ADLS: ICD-10-CM

## 2023-05-25 DIAGNOSIS — M25.611 SHOULDER JOINT STIFFNESS, RIGHT: ICD-10-CM

## 2023-05-25 DIAGNOSIS — Z74.09 IMPAIRED MOBILITY AND ADLS: ICD-10-CM

## 2023-05-30 ENCOUNTER — TREATMENT (OUTPATIENT)
Age: 63
End: 2023-05-30

## 2023-05-30 DIAGNOSIS — M25.611 SHOULDER JOINT STIFFNESS, RIGHT: ICD-10-CM

## 2023-05-30 DIAGNOSIS — M25.511 PAIN IN JOINT OF RIGHT SHOULDER: Primary | ICD-10-CM

## 2023-05-30 DIAGNOSIS — Z74.09 IMPAIRED MOBILITY AND ADLS: ICD-10-CM

## 2023-05-30 DIAGNOSIS — M62.81 MUSCLE WEAKNESS: ICD-10-CM

## 2023-05-30 DIAGNOSIS — Z78.9 IMPAIRED MOBILITY AND ADLS: ICD-10-CM

## 2023-05-30 NOTE — PROGRESS NOTES
[facility]  [address] [facility] [address]  Dept: [phone]      Physical Therapy Daily Note     Insurance: Today is 6/12 visits per [facility] insurance (visits  2023)    Total Timed Procedure Codes: 45 min, Total Time: 45 min     Referring MD: Flo Hall MD  Referral for: Right shoulder pain  Onset/Injury Date: 2023      Diagnosis:     ICD-10-CM    1. Pain in joint of right shoulder  M25.511       2. Shoulder joint stiffness, right  M25.611       3. Impaired mobility and ADLs  Z74.09     Z78.9       4. Muscle weakness  M62.81              Therapy precautions:None  Co-morbidities affecting plan of care: NA per patient report        SUBJECTIVE     Patient reports no pain in therapy today right shoulder. He states he has aching and stiffness right shoulder. This has decreased. He states AROM right shoulder is not painful. OBJECTIVE     Treatment provided today:  Therapeutic exercise (95161) x 45 min to develop ROM, strength, endurance and flexibility. Included:   UBE L1 6 minutes (3 minutes forward and 3 backwards)    Pec stretch with LTR 4x  Wall slides for stretching 20x    T band black tube IR, ER, row and extension 30x    Standing shoulder flexion full range with 3# (pain-free range) 15x2  Standing shoulder abduction full range with 3# (pain-free) 15x2  SL ER with 3# 20x  Bilateral ER with green band 15x2  HA with green band 15x2  Supine flexion with 3# 20x  SL abduction with 3# 20x      Patient Education on the condition/pathology, involved anatomy, and exercise rationale. A/PROM Measures: 2023     Shoulder Right Left Comment   Flexion 150A 150A  NO pain right   Scaption 150A 150A     IR  65A 65A AROM at 90° abd   ER 90A 90A AROM at 90° abd   IR behind back T10                                  ASSESSMENT     Patient has no pain right shoulder today. He has no pain with AROM right shoulder or PREs today.  He is able to sleep at night with less discomfort and

## 2023-05-31 NOTE — PROGRESS NOTES
[address] [place] [facility]  [address] [facility] [address]  Dept: [phone]      Physical Therapy Daily Note     Insurance: Today is 7/12 visits per [facility] insurance (visits  2023)    Total Timed Procedure Codes: 45 min, Total Time: 45 min     Referring MD: Dioni Lyon MD  Referral for: Right shoulder pain  Onset/Injury Date: 2023      Diagnosis:     ICD-10-CM    1. Pain in joint of right shoulder  M25.511       2. Shoulder joint stiffness, right  M25.611       3. Impaired mobility and ADLs  Z74.09     Z78.9       4. Muscle weakness  M62.81       5. Biceps tendonitis on right [M75.21 (ICD-10-CM)]  M75.21              Therapy precautions:None  Co-morbidities affecting plan of care: NA per patient report        SUBJECTIVE     Patient reports no pain in therapy today right shoulder. He states right shoulder just aches with full ROM. He will see Dr. Edwar Emmanuel next week. OBJECTIVE     Treatment provided today:  Therapeutic exercise (09985) x 45 min to develop ROM, strength, endurance and flexibility. Included:   UBE L1 6 minutes (3 minutes forward and 3 backwards)    Pec stretch with LTR 4x  Open book stretch 8x    Standing shoulder flexion full range with 3# (pain-free range) 15x2  Standing shoulder abduction full range with 3# (pain-free) 15x2  SL ER with 3# 20x  Bilateral ER with green band 15x2  HA with green band 15x2  Supine flexion with 3# 20x  SL abduction with 3# 20x  Supine shoulder protraction with 3# 30x    Wall push ups 20x  Biceps curls 3 way with 8# 20x    Patient Education on the condition/pathology, involved anatomy, and exercise rationale. A/PROM Measures: 2023     Shoulder Right Left Comment   Flexion 150A 150A  NO pain right   Scaption 150A 150A     IR  65A 65A AROM at 90° abd   ER 90A 90A AROM at 90° abd   IR behind back T10                                  ASSESSMENT     Patient has no pain right shoulder today.  He has no pain with AROM right shoulder or PREs

## 2023-06-01 ENCOUNTER — TREATMENT (OUTPATIENT)
Age: 63
End: 2023-06-01

## 2023-06-01 DIAGNOSIS — Z74.09 IMPAIRED MOBILITY AND ADLS: ICD-10-CM

## 2023-06-01 DIAGNOSIS — M25.511 PAIN IN JOINT OF RIGHT SHOULDER: Primary | ICD-10-CM

## 2023-06-01 DIAGNOSIS — Z78.9 IMPAIRED MOBILITY AND ADLS: ICD-10-CM

## 2023-06-01 DIAGNOSIS — M62.81 MUSCLE WEAKNESS: ICD-10-CM

## 2023-06-01 DIAGNOSIS — M75.21 BICEPS TENDONITIS ON RIGHT: ICD-10-CM

## 2023-06-01 DIAGNOSIS — M25.611 SHOULDER JOINT STIFFNESS, RIGHT: ICD-10-CM

## 2023-06-05 NOTE — PROGRESS NOTES
all yard work without right shoulder pain-MET  Patient will be able to use R UE for all ADLs without deviation  Able to do all work duties without pain or limitations-MET  Improve Quick Dash to >/= 0% dysfunction/function   Patient will meet \"Patient stated Goals\" as documented in initial evaluation  Patient is discharged from [facility]: [id number]

## 2023-06-06 ENCOUNTER — TREATMENT (OUTPATIENT)
Age: 63
End: 2023-06-06
Payer: COMMERCIAL

## 2023-06-06 ENCOUNTER — OFFICE VISIT (OUTPATIENT)
Dept: ORTHOPEDIC SURGERY | Age: 63
End: 2023-06-06

## 2023-06-06 DIAGNOSIS — M62.81 MUSCLE WEAKNESS: ICD-10-CM

## 2023-06-06 DIAGNOSIS — Z74.09 IMPAIRED MOBILITY AND ADLS: ICD-10-CM

## 2023-06-06 DIAGNOSIS — M25.611 SHOULDER JOINT STIFFNESS, RIGHT: ICD-10-CM

## 2023-06-06 DIAGNOSIS — M25.511 PAIN IN JOINT OF RIGHT SHOULDER: Primary | ICD-10-CM

## 2023-06-06 DIAGNOSIS — Z78.9 IMPAIRED MOBILITY AND ADLS: ICD-10-CM

## 2023-06-06 DIAGNOSIS — M75.21 BICEPS TENDONITIS ON RIGHT: ICD-10-CM

## 2023-06-06 DIAGNOSIS — M67.911 TENDINOPATHY OF RIGHT ROTATOR CUFF: Primary | ICD-10-CM

## 2023-06-06 PROCEDURE — 97110 THERAPEUTIC EXERCISES: CPT | Performed by: PHYSICAL THERAPIST

## 2023-06-06 NOTE — PROGRESS NOTES
Name: Hussain Tran  YOB: 1960  Gender: male  MRN: [id number]  Date of Encounter:  6/6/2023       CHIEF COMPLAINT:     Chief Complaint   Patient presents with    Follow-up     Right shoulder        SUBJECTIVE/OBJECTIVE:      HPI:    Patient is a 66 y.o. pleasant male who presents today for a return evaluation of his right shoulder. Working diagnosis:   1. Tendinopathy of right rotator cuff    2. Biceps tendonitis on right       LOV: 5/18/2023     Last visit we performed a glenohumeral joint injection. He is currently in physical therapy and is seeing a lot of improvement in his right shoulder pain and motion. He was told that he needed to return to the office today by his Worker's . Overall he reports around 75% improvement in the shoulder     PAST HISTORY:   Past medical, surgical, family, social history and allergies reviewed by me. Unchanged from prior visit. REVIEW OF SYSTEMS:   As noted in HPI. PHYSICAL EXAMINATION:     Gen: Well-developed, no acute distress   HEENT: NC/AT, EOMI   Neck: Trachea midline, normal ROM   CV: Regular rhythm by palpation of distal pulse, normal capillary refill   Pulm: No respiratory distress, no stridor   Psychiatric: Well oriented, normal mood and affect. Skin: No rashes, lesions or ulcers, normal temperature, turgor, and texture on uninvolved extremity. ORTHO EXAM:    R shoulder: Forward flexion 170, abduction 170, external rotation 45, internal rotation to upper lumbar spine  No tenderness  Resisted abduction, external and internal rotation 5/5  Negative empty can. Mild pain with Chapman. Negative Neer's. Negative speeds. 2+ radial pulse, S ILT    DIAGNOSTIC IMAGING:     I have reviewed prior imaging studies. ASSESSMENT/PLAN:   1. Tendinopathy of right rotator cuff    2. Biceps tendonitis on right         His shoulder pain and exam has improved significantly with injection and therapy.   Based on his physical

## 2023-06-08 ENCOUNTER — TREATMENT (OUTPATIENT)
Age: 63
End: 2023-06-08

## 2023-06-08 DIAGNOSIS — M75.21 BICEPS TENDONITIS ON RIGHT: ICD-10-CM

## 2023-06-08 DIAGNOSIS — M62.81 MUSCLE WEAKNESS: ICD-10-CM

## 2023-06-08 DIAGNOSIS — M25.611 SHOULDER JOINT STIFFNESS, RIGHT: ICD-10-CM

## 2023-06-08 DIAGNOSIS — M67.911 TENDINOPATHY OF RIGHT ROTATOR CUFF: ICD-10-CM

## 2023-06-08 DIAGNOSIS — Z78.9 IMPAIRED MOBILITY AND ADLS: ICD-10-CM

## 2023-06-08 DIAGNOSIS — M25.511 PAIN IN JOINT OF RIGHT SHOULDER: Primary | ICD-10-CM

## 2023-06-08 DIAGNOSIS — Z74.09 IMPAIRED MOBILITY AND ADLS: ICD-10-CM

## 2023-06-08 NOTE — PROGRESS NOTES
[facility] [facility]  [address] [facility] [address]  Dept: [phone]      Physical Therapy Daily Note     Insurance: Today is 9/12 visits per [facility] insurance (visits  2023)    Total Timed Procedure Codes: 45 min, Total Time: 45 min     Referring MD: Cynthia Quick MD  Referral for: Right shoulder pain  Onset/Injury Date: 2023      Diagnosis:     ICD-10-CM    1. Pain in joint of right shoulder  M25.511       2. Shoulder joint stiffness, right  M25.611       3. Impaired mobility and ADLs  Z74.09     Z78.9       4. Muscle weakness  M62.81       5. Biceps tendonitis on right [M75.21 (ICD-10-CM)]  M75.21       6. Tendinopathy of right rotator cuff [P13.979 (ICD-10-CM)]  M67.911              Therapy precautions:None  Co-morbidities affecting plan of care: NA per patient report        SUBJECTIVE     Patient reports no pain with daily activities or HEP. Wakes 1-2 times per week due to shoulder pain \"from the way I'm sleeping\" Able to control with changing positions. OBJECTIVE     Treatment provided today:  Therapeutic exercise (10573) x 45 min to develop ROM, strength, endurance and flexibility. Included:   UBE L1 6 minutes (3 minutes forward and 3 backwards)    Standing shoulder flexion full range with 3# (pain-free range) 15x2  Standing shoulder abduction full range with 3# (pain-free) 15x2  SL ER with 3# 20x  Bilateral ER with blue band 20x  HA with blue band 20x  Supine flexion with 3# 20x  SL abduction with 3# 20x  Supine shoulder protraction with 3# 30x    Wall push ups 20x  Biceps curls 3 way with 8# 20x    Patient Education on progression of HEP, S/sx of overload. Pain Assessment: 2023  Pain location: anterior right shoulder    Average Pain/symptom intensity (0-10 scale)  Last 24 hours: 1/10  Last week (1-7 days at worst): 2/10  How often do you feel symptoms? Infrequently (15% of time)       ASSESSMENT     Patient has progressed well.  All goals met except mild,

## 2023-09-19 ENCOUNTER — OFFICE VISIT (OUTPATIENT)
Dept: ORTHOPEDIC SURGERY | Age: 63
End: 2023-09-19
Payer: COMMERCIAL

## 2023-09-19 DIAGNOSIS — M67.911 TENDINOPATHY OF RIGHT ROTATOR CUFF: Primary | ICD-10-CM

## 2023-09-19 PROCEDURE — 99214 OFFICE O/P EST MOD 30 MIN: CPT | Performed by: STUDENT IN AN ORGANIZED HEALTH CARE EDUCATION/TRAINING PROGRAM

## 2023-09-19 NOTE — PROGRESS NOTES
Name: Javier Vaz  YOB: 1960  Gender: male  MRN: [id number]  Date of Encounter:  9/19/2023       CHIEF COMPLAINT:     Chief Complaint   Patient presents with    Follow-up     Right Shoulder        SUBJECTIVE/OBJECTIVE:      HPI:    Patient is a 66 y.o. pleasant male who presents today for a return evaluation of his right shoulder. LOV: 6/6/2023   Working diagnosis: biceps tendonitis, rotator cuff arthropathy     Recall hx:   Zuleyma Tran has had nearly a year of shoulder pain. The shoulder pain generally bothers him anteriorly, worse with overhead movement, repetitive motion. he feels occasional clicking in the shoulder. .  He has intact range of motion. He does not report some much pain as a general discomfort. He has had no numbness. He has tried really nothing for relief. He was previously seen by Dr. Marty Castillo in Feb. 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 His shoulder issue has been managed primarily by work well, under [facility], and therefore he has been going between several different offices for his shoulder. He was seen at another orthopedic office as well.  he took prednisone and did some PT but pain didn't improve. He got [facility] at [facility]. He then saw myself, and his images of the shoulder were poor. We discussed repeating, but he did not want to repeat MRI. We ultimately performed GHJ injection and had him return for formal PT and he got great relief, until 6 weeks ago. The pain has returned. He is still doing a lot of repetitive activity daily with work. PAST HISTORY:   Past medical, surgical, family, social history and allergies reviewed by me. Unchanged from prior visit. REVIEW OF SYSTEMS:   As noted in HPI.      PHYSICAL EXAMINATION:     Gen: Well-developed, no acute distress   HEENT: NC/AT, EOMI   Neck: Trachea midline, normal ROM   CV: Regular rhythm by palpation of distal pulse,

## 2023-10-04 ENCOUNTER — TELEPHONE (OUTPATIENT)
Dept: ORTHOPEDIC SURGERY | Age: 63
End: 2023-10-04

## 2023-10-10 ENCOUNTER — OFFICE VISIT (OUTPATIENT)
Dept: ORTHOPEDIC SURGERY | Age: 63
End: 2023-10-10

## 2023-10-10 DIAGNOSIS — M75.21 BICEPS TENDONITIS ON RIGHT: Primary | ICD-10-CM

## 2023-10-10 DIAGNOSIS — M67.911 TENDINOPATHY OF RIGHT ROTATOR CUFF: ICD-10-CM

## 2023-10-10 RX ORDER — METHYLPREDNISOLONE ACETATE 40 MG/ML
40 INJECTION, SUSPENSION INTRA-ARTICULAR; INTRALESIONAL; INTRAMUSCULAR; SOFT TISSUE ONCE
Status: COMPLETED | OUTPATIENT
Start: 2023-10-10 | End: 2023-10-10

## 2023-10-10 RX ADMIN — METHYLPREDNISOLONE ACETATE 40 MG: 40 INJECTION, SUSPENSION INTRA-ARTICULAR; INTRALESIONAL; INTRAMUSCULAR; SOFT TISSUE at 11:12

## 2023-12-12 ENCOUNTER — OFFICE VISIT (OUTPATIENT)
Dept: ORTHOPEDIC SURGERY | Age: 63
End: 2023-12-12
Payer: COMMERCIAL

## 2023-12-12 DIAGNOSIS — M75.21 BICEPS TENDONITIS ON RIGHT: Primary | ICD-10-CM

## 2023-12-12 PROCEDURE — 99213 OFFICE O/P EST LOW 20 MIN: CPT | Performed by: STUDENT IN AN ORGANIZED HEALTH CARE EDUCATION/TRAINING PROGRAM

## 2023-12-12 NOTE — PROGRESS NOTES
Name: Ruperto Ivy  YOB: 1960  Gender: male  MRN: [id number]  Date of Encounter:  12/12/2023       CHIEF COMPLAINT:     Chief Complaint   Patient presents with    Follow-up     Right Shoulder        SUBJECTIVE/OBJECTIVE:      HPI:    Patient is a 66 y.o. pleasant male who presents today for a return evaluation of his right shoulder. Working diagnosis:   1. Biceps tendonitis on right       LOV: 10/10/2023     Recall hx:   Oziel Magana has had nearly a year of shoulder pain secondary to biceps long head tendon tear and synovitis, superior labral tearing, AC joint arthritis. He has a small partial thickness tear of the subscapularis. He shoulder pain generally bothers him anteriorly, worse with overhead movement, repetitive motion. he feels occasional clicking in the shoulder. .  He has intact range of motion. He does not report some much pain as a general discomfort. He has had no numbness. He has tried really nothing for relief. 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 He left that job. His shoulder issue has been managed primarily by work well, under [employer], and therefore he has been going between several different offices for his shoulder. He was seen at another orthopedic office as well.  he took prednisone and did some PT but pain didn't improve. He got an [facility] at [facility]. He then saw myself, and his images of the shoulder were poor. We discussed repeating, but he did not want to repeat MRI. We ultimately performed GHJ injection and had him return for formal PT and he got great relief, until 6 weeks ago. He is still doing a lot of repetitive activity daily with work. His pain is tolerable with ibuprofen but he wants to stop his repetitive shoulder work. Prev. Performed GHJ injection with relief, then biceps tendon sheath in 10/10/23.  He did get pain relief from

## 2024-01-09 ENCOUNTER — CLINICAL DOCUMENTATION (OUTPATIENT)
Dept: ORTHOPEDIC SURGERY | Age: 64
End: 2024-01-09

## 2024-01-09 NOTE — PROGRESS NOTES
Invoice request and completed 14B form were sent via fax to [employer] (Fax: [phone]). Received confirmation that fax was sent successfully.

## 2024-01-25 ENCOUNTER — TELEPHONE (OUTPATIENT)
Dept: FAMILY MEDICINE CLINIC | Facility: CLINIC | Age: 64
End: 2024-01-25

## 2024-01-25 DIAGNOSIS — F51.04 PSYCHOPHYSIOLOGICAL INSOMNIA: ICD-10-CM

## 2024-01-25 NOTE — TELEPHONE ENCOUNTER
Patient called and requested a refill for the trazodone 100mg. Patient would like the med sent to the [facility] at [address] . Patient has changed insurance and the insurance is not accepted at this clinic. Patient stated he has an appointment for a new PCP in March.

## 2024-01-26 RX ORDER — TRAZODONE HYDROCHLORIDE 100 MG/1
100 TABLET ORAL NIGHTLY
Qty: 90 TABLET | Refills: 0 | Status: SHIPPED | OUTPATIENT
Start: 2024-01-26

## 2024-01-31 DIAGNOSIS — J20.9 ACUTE BRONCHITIS, UNSPECIFIED ORGANISM: ICD-10-CM

## 2024-01-31 RX ORDER — ALBUTEROL SULFATE 90 UG/1
AEROSOL, METERED RESPIRATORY (INHALATION)
Qty: 18 G | Refills: 2 | OUTPATIENT
Start: 2024-01-31

## 2024-03-26 ENCOUNTER — CLINICAL DOCUMENTATION (OUTPATIENT)
Dept: ORTHOPEDIC SURGERY | Age: 64
End: 2024-03-26

## 2024-08-28 ENCOUNTER — APPOINTMENT (OUTPATIENT)
Dept: CT IMAGING | Age: 64
End: 2024-08-28
Payer: COMMERCIAL

## 2024-08-28 ENCOUNTER — HOSPITAL ENCOUNTER (EMERGENCY)
Age: 64
Discharge: HOME | End: 2024-08-28
Attending: EMERGENCY MEDICINE
Payer: COMMERCIAL

## 2024-08-28 VITALS
HEART RATE: 80 BPM | OXYGEN SATURATION: 100 % | DIASTOLIC BLOOD PRESSURE: 99 MMHG | RESPIRATION RATE: 20 BRPM | SYSTOLIC BLOOD PRESSURE: 150 MMHG | WEIGHT: 220 LBS | TEMPERATURE: 98 F | HEIGHT: 72 IN | BODY MASS INDEX: 29.8 KG/M2

## 2024-08-28 DIAGNOSIS — T14.90XA MUSCLE INJURY: ICD-10-CM

## 2024-08-28 DIAGNOSIS — M79.601 RIGHT ARM PAIN: Primary | ICD-10-CM

## 2024-08-28 DIAGNOSIS — T14.8XXA HEMATOMA: ICD-10-CM

## 2024-08-28 LAB
ANION GAP SERPL CALC-SCNC: 11 MMOL/L (ref 9–18)
BASOPHILS # BLD: 0.1 K/UL (ref 0–0.2)
BASOPHILS NFR BLD: 1 % (ref 0–2)
BUN SERPL-MCNC: 10 MG/DL (ref 8–23)
CALCIUM SERPL-MCNC: 9.5 MG/DL (ref 8.8–10.2)
CHLORIDE SERPL-SCNC: 103 MMOL/L (ref 98–107)
CK SERPL-CCNC: 270 U/L (ref 21–215)
CO2 SERPL-SCNC: 27 MMOL/L (ref 20–28)
CREAT SERPL-MCNC: 0.79 MG/DL (ref 0.8–1.3)
DIFFERENTIAL METHOD BLD: ABNORMAL
EOSINOPHIL # BLD: 0.4 K/UL (ref 0–0.8)
EOSINOPHIL NFR BLD: 3 % (ref 0.5–7.8)
ERYTHROCYTE [DISTWIDTH] IN BLOOD BY AUTOMATED COUNT: 14.4 % (ref 11.9–14.6)
GLUCOSE SERPL-MCNC: 101 MG/DL (ref 70–99)
HCT VFR BLD AUTO: 46.7 % (ref 41.1–50.3)
HGB BLD-MCNC: 14.7 G/DL (ref 13.6–17.2)
IMM GRANULOCYTES # BLD AUTO: 0.1 K/UL (ref 0–0.5)
IMM GRANULOCYTES NFR BLD AUTO: 1 % (ref 0–5)
LYMPHOCYTES # BLD: 2.9 K/UL (ref 0.5–4.6)
LYMPHOCYTES NFR BLD: 28 % (ref 13–44)
MCH RBC QN AUTO: 29.8 PG (ref 26.1–32.9)
MCHC RBC AUTO-ENTMCNC: 31.5 G/DL (ref 31.4–35)
MCV RBC AUTO: 94.7 FL (ref 82–102)
MONOCYTES # BLD: 0.6 K/UL (ref 0.1–1.3)
MONOCYTES NFR BLD: 6 % (ref 4–12)
NEUTS SEG # BLD: 6.5 K/UL (ref 1.7–8.2)
NEUTS SEG NFR BLD: 61 % (ref 43–78)
NRBC # BLD: 0 K/UL (ref 0–0.2)
PLATELET # BLD AUTO: 266 K/UL (ref 150–450)
PMV BLD AUTO: 9.1 FL (ref 9.4–12.3)
POTASSIUM SERPL-SCNC: 4.5 MMOL/L (ref 3.5–5.1)
RBC # BLD AUTO: 4.93 M/UL (ref 4.23–5.6)
SODIUM SERPL-SCNC: 141 MMOL/L (ref 136–145)
WBC # BLD AUTO: 10.5 K/UL (ref 4.3–11.1)

## 2024-08-28 PROCEDURE — 6360000004 HC RX CONTRAST MEDICATION: Performed by: EMERGENCY MEDICINE

## 2024-08-28 PROCEDURE — 82550 ASSAY OF CK (CPK): CPT

## 2024-08-28 PROCEDURE — 73201 CT UPPER EXTREMITY W/DYE: CPT

## 2024-08-28 PROCEDURE — 85025 COMPLETE CBC W/AUTO DIFF WBC: CPT

## 2024-08-28 PROCEDURE — 99285 EMERGENCY DEPT VISIT HI MDM: CPT

## 2024-08-28 PROCEDURE — 80048 BASIC METABOLIC PNL TOTAL CA: CPT

## 2024-08-28 RX ORDER — IOPAMIDOL 755 MG/ML
100 INJECTION, SOLUTION INTRAVASCULAR
Status: COMPLETED | OUTPATIENT
Start: 2024-08-28 | End: 2024-08-28

## 2024-08-28 RX ORDER — OXYCODONE AND ACETAMINOPHEN 5; 325 MG/1; MG/1
1 TABLET ORAL EVERY 6 HOURS PRN
Qty: 12 TABLET | Refills: 0 | Status: SHIPPED | OUTPATIENT
Start: 2024-08-28 | End: 2024-08-31

## 2024-08-28 RX ADMIN — IOPAMIDOL 100 ML: 755 INJECTION, SOLUTION INTRAVENOUS at 13:00

## 2024-08-28 ASSESSMENT — LIFESTYLE VARIABLES
HOW OFTEN DO YOU HAVE A DRINK CONTAINING ALCOHOL: 4 OR MORE TIMES A WEEK
HOW MANY STANDARD DRINKS CONTAINING ALCOHOL DO YOU HAVE ON A TYPICAL DAY: 3 OR 4

## 2024-08-28 ASSESSMENT — PAIN DESCRIPTION - LOCATION: LOCATION: ARM

## 2024-08-28 ASSESSMENT — PAIN DESCRIPTION - ORIENTATION: ORIENTATION: RIGHT

## 2024-08-28 ASSESSMENT — PAIN SCALES - GENERAL: PAINLEVEL_OUTOF10: 10

## 2024-08-28 ASSESSMENT — PAIN - FUNCTIONAL ASSESSMENT: PAIN_FUNCTIONAL_ASSESSMENT: 0-10

## 2024-08-28 NOTE — ED TRIAGE NOTES
Pt reports pulling a muscle in right arm 8 weeks ago and hurt arm again this Friday while vacuuming and has since gotten bruise.  Pt has hx of fatty tumors and has one on right forearm.  [facility] with clear results.

## 2024-08-28 NOTE — ED PROVIDER NOTES
Emergency Department Provider Note                   PCP:                Franki Askew MD               Age: 64 y.o.      Sex: male       ICD-10-CM    1. Right arm pain  M79.601       2. Hematoma  T14.8XXA oxyCODONE-acetaminophen (PERCOCET) 5-325 MG per tablet      3. Muscle injury  T14.90XA oxyCODONE-acetaminophen (PERCOCET) 5-325 MG per tablet          DISPOSITION Decision To Discharge 08/28/2024 03:43:11 PM  Condition at Disposition: Good        MDM  Number of Diagnoses or Management Options  Hematoma  Muscle injury  Right arm pain  Diagnosis management comments: MEDICAL DECISION MAKING  Complexity of Problems Addressed:  1 or more acute illnesses that pose a threat to life or bodily function.     Data Reviewed and Analyzed:  Category 1:   I independently ordered and reviewed each unique test.  I reviewed external records: ED visit note from an outside group.     Category 3: Discussion of management or test interpretation.  The patient presents with a continued issue of recurrent bruising, swelling, and pain of the right forearm. He does have some palpable mass in the soft tissue and evidence of likely recurrent bleeding.  Given this, a CT scan was done.  This shows either a soft tissue mass or muscle tear with bleeding.  Orthopedics was consulted.  They will see the patient tomorrow morning in the office for further evaluation and treatment.  A prescription for analgesics was sent to the patient's pharmacy.  He was in complete agreement with this plan.  The management of this patient was discussed with an external consultant.      Risk of Complications and/or Morbidity of Patient Management:  Prescription drug management performed.  Discussion with external consultants.                Orders Placed This Encounter   Procedures    CT UPPER EXTREMITY W CONTRAST    CBC with Auto Differential    BMP    CK    Saline lock IV        Medications   iopamidol (ISOVUE-370) 76 % injection 100 mL (100 mLs IntraVENous

## 2024-08-29 ENCOUNTER — TELEPHONE (OUTPATIENT)
Dept: ORTHOPEDIC SURGERY | Age: 64
End: 2024-08-29

## 2024-08-29 ENCOUNTER — OFFICE VISIT (OUTPATIENT)
Dept: ORTHOPEDIC SURGERY | Age: 64
End: 2024-08-29
Payer: COMMERCIAL

## 2024-08-29 DIAGNOSIS — S50.11XA HEMATOMA OF RIGHT FOREARM: Primary | ICD-10-CM

## 2024-08-29 DIAGNOSIS — Z86.018 HISTORY OF LIPOMA: ICD-10-CM

## 2024-08-29 PROBLEM — E78.2 MIXED HYPERLIPIDEMIA: Status: ACTIVE | Noted: 2024-03-01

## 2024-08-29 PROBLEM — D68.51 FACTOR V LEIDEN (HCC): Status: ACTIVE | Noted: 2024-03-01

## 2024-08-29 PROBLEM — Z72.0 TOBACCO USE: Status: ACTIVE | Noted: 2024-03-01

## 2024-08-29 PROCEDURE — 99203 OFFICE O/P NEW LOW 30 MIN: CPT | Performed by: ORTHOPAEDIC SURGERY

## 2024-08-29 RX ORDER — WARFARIN SODIUM 10 MG/1
10 TABLET ORAL
COMMUNITY

## 2024-08-29 NOTE — TELEPHONE ENCOUNTER
He was seen this morning. He has a couple of questions about the labwork that he ordered and is asking for a return call.

## 2024-08-29 NOTE — TELEPHONE ENCOUNTER
Returned call to Pt . He stated during his visit he forgot to mention that he has Factor V Leiden and takes Warfarin 10mg daily. Dr. Villagomez made aware. LH

## 2024-08-29 NOTE — PROGRESS NOTES
Progress Note    Patient: Dawson Archer MRN: [id number]  SSN: [id number]    YOB: 1960  Age: 64 y.o.  Sex: male        8/29/2024      Subjective:     Patient is here today in follow-up.  He is a gentleman who had an incident about 4 weeks ago he was playing golf and he felt something pull in his right forearm.  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  He does have a past medical history significant for multiple lipomas.  He had several of them removed including one removed from the volar aspect of his right forearm in the past.  He did have a CT scan yesterday that was read out as a differential diagnosis that is described in the report.    Objective:     There were no vitals filed for this visit.       Physical Exam:     Skin - no open wounds or pressure sores, he does have a significant amount of hematoma and swelling in his right forearm this is a pretty impressive amount of hematoma.  Motor and sensory function intact in RIGHT UPPER extremity  Pulses palpable in RIGHT UPPER extremity     XRAY FINDINGS:  I have reviewed his CT scan which does have this appearance of hematoma and I do think that he probably has some lipoma as well    Assessment:     Right forearm hematoma    Plan:     I do think it would be wise to get an MRI of his right forearm to follow this up and after the MRI I think it would also be a good idea to get just some labs.  They have done some initial workup and everything has really been normal with the exception of his CK being elevated a little bit.  So he has not had a bleeding time in quite some time so I think I would like to get an INR and his liver enzymes have not been done in 5 months I

## 2024-08-30 DIAGNOSIS — R79.1 ELEVATED INR: Primary | ICD-10-CM

## 2024-08-30 DIAGNOSIS — S50.11XA HEMATOMA OF RIGHT FOREARM: ICD-10-CM

## 2024-08-30 DIAGNOSIS — Z86.018 HISTORY OF LIPOMA: ICD-10-CM

## 2024-08-30 LAB
ALBUMIN SERPL-MCNC: 3.8 G/DL (ref 3.2–4.6)
ALBUMIN/GLOB SERPL: 1.4 (ref 1–1.9)
ALP SERPL-CCNC: 73 U/L (ref 40–129)
ALT SERPL-CCNC: 21 U/L (ref 12–65)
AST SERPL-CCNC: 24 U/L (ref 15–37)
BILIRUB DIRECT SERPL-MCNC: <0.2 MG/DL (ref 0–0.4)
BILIRUB SERPL-MCNC: 0.4 MG/DL (ref 0–1.2)
GLOBULIN SER CALC-MCNC: 2.8 G/DL (ref 2.3–3.5)
INR PPP: 8.3
PROT SERPL-MCNC: 6.6 G/DL (ref 6.3–8.2)
PROTHROMBIN TIME: 73.5 SEC (ref 11.3–14.9)

## 2024-08-31 NOTE — PROGRESS NOTES
I was notified by the lab at 9:30 PM on 8/30/24 that Pt had a critical INR of 8.3 and PT was 73.5.  I contacted Dr. Arnol Simons with the hospitalist service to discuss the Pt and he advised that no admission or administration of Vitamin K was warranted if there is no active bleeding and INR is less than 10.  Patient was subsequently contacted and instructed to hold his coumadin for now which he takes due to h/o factor V Leiden and DVT in the past and he voiced clear understanding.  He denied any worsening swelling of forearm hematoma, increased bruisability, or any other sign of active bleeding.  We will plan for him to have PT/INR redrawn on 9/4.  He was instructed to contact his PCP, Dr. Franki Askew, to schedule follow up for further management of long term anticoagulation.  He was instructed avoid any strenuous or physical contact activities and to proceed to the ED immediately with any concerns for active bleeding and he voiced clear understanding.

## 2024-09-04 DIAGNOSIS — R79.1 ELEVATED INR: ICD-10-CM

## 2024-09-04 LAB
INR PPP: 0.9
PROTHROMBIN TIME: 13.1 SEC (ref 11.3–14.9)

## 2024-09-05 ENCOUNTER — OFFICE VISIT (OUTPATIENT)
Dept: FAMILY MEDICINE CLINIC | Facility: CLINIC | Age: 64
End: 2024-09-05

## 2024-09-05 VITALS
HEART RATE: 62 BPM | OXYGEN SATURATION: 98 % | SYSTOLIC BLOOD PRESSURE: 138 MMHG | WEIGHT: 222 LBS | DIASTOLIC BLOOD PRESSURE: 82 MMHG | BODY MASS INDEX: 30.11 KG/M2

## 2024-09-05 DIAGNOSIS — D68.51 FACTOR V LEIDEN (HCC): Primary | ICD-10-CM

## 2024-09-05 DIAGNOSIS — E78.2 MIXED HYPERLIPIDEMIA: ICD-10-CM

## 2024-09-05 DIAGNOSIS — Z00.01 ENCOUNTER FOR ROUTINE ADULT HEALTH EXAMINATION WITH ABNORMAL FINDINGS: ICD-10-CM

## 2024-09-05 DIAGNOSIS — F51.04 PSYCHOPHYSIOLOGICAL INSOMNIA: ICD-10-CM

## 2024-09-05 DIAGNOSIS — Z12.5 PROSTATE CANCER SCREENING: ICD-10-CM

## 2024-09-05 RX ORDER — ATORVASTATIN CALCIUM 10 MG/1
10 TABLET, FILM COATED ORAL NIGHTLY
COMMUNITY
Start: 2024-04-03 | End: 2024-09-05 | Stop reason: SDUPTHER

## 2024-09-05 RX ORDER — TRAZODONE HYDROCHLORIDE 100 MG/1
100 TABLET ORAL NIGHTLY
Qty: 90 TABLET | Refills: 3 | Status: SHIPPED | OUTPATIENT
Start: 2024-09-05

## 2024-09-05 RX ORDER — ATORVASTATIN CALCIUM 10 MG/1
10 TABLET, FILM COATED ORAL NIGHTLY
Qty: 90 TABLET | Refills: 3 | Status: SHIPPED | OUTPATIENT
Start: 2024-09-05 | End: 2025-09-05

## 2024-09-05 ASSESSMENT — ENCOUNTER SYMPTOMS
WHEEZING: 0
SHORTNESS OF BREATH: 0
CHEST TIGHTNESS: 0
ABDOMINAL PAIN: 0
ANAL BLEEDING: 0
BLOOD IN STOOL: 0

## 2024-09-05 NOTE — PROGRESS NOTES
Dawson Archer is a 64 y.o. male who presents today for the following:  Chief Complaint   Patient presents with    Blood Work     Pt has factor V and wants to go on medication after recent blood work        No Known Allergies    Current Outpatient Medications   Medication Sig Dispense Refill    warfarin (COUMADIN) 10 MG tablet Take 1 tablet by mouth      traZODone (DESYREL) 100 MG tablet Take 1 tablet by mouth nightly 90 tablet 0    albuterol sulfate HFA (PROVENTIL;VENTOLIN;PROAIR) 108 (90 Base) MCG/ACT inhaler INHALE 2 PUFFS INTO THE LUNGS FOUR TIMES DAILY AS NEEDED FOR WHEEZING 18 g 2     No current facility-administered medications for this visit.       Past Medical History:   Diagnosis Date    Anxiety 01/09/2021    Erectile dysfunction 02/14/2019    Factor V Leiden (HCC)     Osteoarthritis 06/10/2022    Right Knee       Past Surgical History:   Procedure Laterality Date    HERNIA REPAIR      umbilical    LIPOMA RESECTION      TONSILLECTOMY      UROLOGICAL SURGERY      stricture       Social History     Tobacco Use    Smoking status: Some Days     Types: Cigars    Smokeless tobacco: Never    Tobacco comments:     Quit smoking: smokes one cigar a day   Substance Use Topics    Alcohol use: Yes        Family History   Problem Relation Age of Onset    Arthritis Mother     High Blood Pressure Mother     Diabetes Father     Colon Cancer Neg Hx     Prostate Cancer Neg Hx        Patient is here today for annual physical.  Patient has the following chronic diseases:  -Urinary retention: Patient reports history of urinary retention.  He currently self catheterizes 2 times a week.  Uses [employer] go [employer] french. Not interested in seeing urology.  -Factor 5 leiden: patient has been self dosing coumadin.  Had INR that was elevated.  Decided to stop coumadin and would like to try newer agent. Had repeat INR that was <1. History of recurrent DVTs.    -right arm pain: injury while golfing.  Large bruise on right forearm.  Had MRI

## 2024-09-12 ENCOUNTER — TELEPHONE (OUTPATIENT)
Dept: ORTHOPEDIC SURGERY | Age: 64
End: 2024-09-12

## 2024-09-17 ENCOUNTER — OFFICE VISIT (OUTPATIENT)
Dept: ORTHOPEDIC SURGERY | Age: 64
End: 2024-09-17
Payer: COMMERCIAL

## 2024-09-17 DIAGNOSIS — S50.11XA HEMATOMA OF RIGHT FOREARM: Primary | ICD-10-CM

## 2024-09-17 PROCEDURE — 99212 OFFICE O/P EST SF 10 MIN: CPT | Performed by: ORTHOPAEDIC SURGERY

## 2024-10-07 DIAGNOSIS — Z00.01 ENCOUNTER FOR ROUTINE ADULT HEALTH EXAMINATION WITH ABNORMAL FINDINGS: ICD-10-CM

## 2024-10-07 DIAGNOSIS — Z12.5 PROSTATE CANCER SCREENING: ICD-10-CM

## 2024-10-07 LAB
ALBUMIN SERPL-MCNC: 3.7 G/DL (ref 3.2–4.6)
ALBUMIN/GLOB SERPL: 1.4 (ref 1–1.9)
ALP SERPL-CCNC: 75 U/L (ref 40–129)
ALT SERPL-CCNC: 25 U/L (ref 8–55)
ANION GAP SERPL CALC-SCNC: 11 MMOL/L (ref 9–18)
AST SERPL-CCNC: 24 U/L (ref 15–37)
BASOPHILS # BLD: 0 K/UL (ref 0–0.2)
BASOPHILS NFR BLD: 0 % (ref 0–2)
BILIRUB SERPL-MCNC: 0.6 MG/DL (ref 0–1.2)
BUN SERPL-MCNC: 8 MG/DL (ref 8–23)
CALCIUM SERPL-MCNC: 9 MG/DL (ref 8.8–10.2)
CHLORIDE SERPL-SCNC: 104 MMOL/L (ref 98–107)
CHOLEST SERPL-MCNC: 165 MG/DL (ref 0–200)
CO2 SERPL-SCNC: 25 MMOL/L (ref 20–28)
CREAT SERPL-MCNC: 0.66 MG/DL (ref 0.8–1.3)
DIFFERENTIAL METHOD BLD: ABNORMAL
EOSINOPHIL # BLD: 0.3 K/UL (ref 0–0.8)
EOSINOPHIL NFR BLD: 3 % (ref 0.5–7.8)
ERYTHROCYTE [DISTWIDTH] IN BLOOD BY AUTOMATED COUNT: 12.9 % (ref 11.9–14.6)
GLOBULIN SER CALC-MCNC: 2.7 G/DL (ref 2.3–3.5)
GLUCOSE SERPL-MCNC: 87 MG/DL (ref 70–99)
HCT VFR BLD AUTO: 43.1 % (ref 41.1–50.3)
HDLC SERPL-MCNC: 80 MG/DL (ref 40–60)
HDLC SERPL: 2.1 (ref 0–5)
HGB BLD-MCNC: 13.5 G/DL (ref 13.6–17.2)
IMM GRANULOCYTES # BLD AUTO: 0 K/UL (ref 0–0.5)
IMM GRANULOCYTES NFR BLD AUTO: 0 % (ref 0–5)
LDLC SERPL CALC-MCNC: 71 MG/DL (ref 0–100)
LYMPHOCYTES # BLD: 2.5 K/UL (ref 0.5–4.6)
LYMPHOCYTES NFR BLD: 26 % (ref 13–44)
MCH RBC QN AUTO: 30.1 PG (ref 26.1–32.9)
MCHC RBC AUTO-ENTMCNC: 31.3 G/DL (ref 31.4–35)
MCV RBC AUTO: 96 FL (ref 82–102)
MONOCYTES # BLD: 0.5 K/UL (ref 0.1–1.3)
MONOCYTES NFR BLD: 5 % (ref 4–12)
NEUTS SEG # BLD: 6.1 K/UL (ref 1.7–8.2)
NEUTS SEG NFR BLD: 66 % (ref 43–78)
NRBC # BLD: 0 K/UL (ref 0–0.2)
PLATELET # BLD AUTO: 218 K/UL (ref 150–450)
PMV BLD AUTO: 9.7 FL (ref 9.4–12.3)
POTASSIUM SERPL-SCNC: 4.2 MMOL/L (ref 3.5–5.1)
PROT SERPL-MCNC: 6.4 G/DL (ref 6.3–8.2)
PSA SERPL-MCNC: 0.3 NG/ML (ref 0–4)
RBC # BLD AUTO: 4.49 M/UL (ref 4.23–5.6)
SODIUM SERPL-SCNC: 140 MMOL/L (ref 136–145)
TRIGL SERPL-MCNC: 67 MG/DL (ref 0–150)
VLDLC SERPL CALC-MCNC: 13 MG/DL (ref 6–23)
WBC # BLD AUTO: 9.4 K/UL (ref 4.3–11.1)

## 2024-11-06 ENCOUNTER — TELEPHONE (OUTPATIENT)
Dept: ORTHOPEDIC SURGERY | Age: 64
End: 2024-11-06

## 2024-11-27 ENCOUNTER — TELEPHONE (OUTPATIENT)
Dept: ORTHOPEDIC SURGERY | Age: 64
End: 2024-11-27

## 2024-11-27 NOTE — TELEPHONE ENCOUNTER
He is scheduled for MRI with and w/o contrast on Dec 2. It is WC and he needs an authorization number. He needs the case # and information.

## 2025-08-30 ASSESSMENT — PATIENT HEALTH QUESTIONNAIRE - PHQ9
SUM OF ALL RESPONSES TO PHQ9 QUESTIONS 1 & 2: 0
SUM OF ALL RESPONSES TO PHQ QUESTIONS 1-9: 0
1. LITTLE INTEREST OR PLEASURE IN DOING THINGS: NOT AT ALL
1. LITTLE INTEREST OR PLEASURE IN DOING THINGS: NOT AT ALL
2. FEELING DOWN, DEPRESSED OR HOPELESS: NOT AT ALL
SUM OF ALL RESPONSES TO PHQ QUESTIONS 1-9: 0
2. FEELING DOWN, DEPRESSED OR HOPELESS: NOT AT ALL

## 2025-09-02 ENCOUNTER — OFFICE VISIT (OUTPATIENT)
Dept: FAMILY MEDICINE CLINIC | Facility: CLINIC | Age: 65
End: 2025-09-02
Payer: COMMERCIAL

## 2025-09-02 VITALS
BODY MASS INDEX: 26.9 KG/M2 | RESPIRATION RATE: 18 BRPM | OXYGEN SATURATION: 99 % | HEIGHT: 72 IN | SYSTOLIC BLOOD PRESSURE: 110 MMHG | DIASTOLIC BLOOD PRESSURE: 72 MMHG | HEART RATE: 89 BPM | WEIGHT: 198.6 LBS | TEMPERATURE: 98 F

## 2025-09-02 DIAGNOSIS — M25.551 RIGHT HIP PAIN: ICD-10-CM

## 2025-09-02 DIAGNOSIS — D68.51 FACTOR V LEIDEN: ICD-10-CM

## 2025-09-02 DIAGNOSIS — F51.04 PSYCHOPHYSIOLOGICAL INSOMNIA: ICD-10-CM

## 2025-09-02 DIAGNOSIS — Z00.01 ENCOUNTER FOR ROUTINE ADULT HEALTH EXAMINATION WITH ABNORMAL FINDINGS: Primary | ICD-10-CM

## 2025-09-02 DIAGNOSIS — J20.9 ACUTE BRONCHITIS, UNSPECIFIED ORGANISM: ICD-10-CM

## 2025-09-02 DIAGNOSIS — E78.2 MIXED HYPERLIPIDEMIA: ICD-10-CM

## 2025-09-02 DIAGNOSIS — Z12.5 PROSTATE CANCER SCREENING: ICD-10-CM

## 2025-09-02 LAB
ALBUMIN SERPL-MCNC: 4.2 G/DL (ref 3.2–4.6)
ALBUMIN/GLOB SERPL: 1.4 (ref 1–1.9)
ALP SERPL-CCNC: 83 U/L (ref 40–129)
ALT SERPL-CCNC: 26 U/L (ref 8–55)
ANION GAP SERPL CALC-SCNC: 17 MMOL/L (ref 7–16)
AST SERPL-CCNC: 24 U/L (ref 15–37)
BASOPHILS # BLD: 0.05 K/UL (ref 0–0.2)
BASOPHILS NFR BLD: 0.4 % (ref 0–2)
BILIRUB SERPL-MCNC: 0.6 MG/DL (ref 0–1.2)
BUN SERPL-MCNC: 9 MG/DL (ref 8–23)
CALCIUM SERPL-MCNC: 9.6 MG/DL (ref 8.8–10.2)
CHLORIDE SERPL-SCNC: 102 MMOL/L (ref 98–107)
CHOLEST SERPL-MCNC: 186 MG/DL (ref 0–200)
CO2 SERPL-SCNC: 24 MMOL/L (ref 20–29)
CREAT SERPL-MCNC: 0.75 MG/DL (ref 0.8–1.3)
DIFFERENTIAL METHOD BLD: ABNORMAL
EOSINOPHIL # BLD: 0.39 K/UL (ref 0–0.8)
EOSINOPHIL NFR BLD: 2.8 % (ref 0.5–7.8)
ERYTHROCYTE [DISTWIDTH] IN BLOOD BY AUTOMATED COUNT: 14.2 % (ref 11.9–14.6)
GLOBULIN SER CALC-MCNC: 3.1 G/DL (ref 2.3–3.5)
GLUCOSE SERPL-MCNC: 88 MG/DL (ref 70–99)
HCT VFR BLD AUTO: 48.7 % (ref 41.1–50.3)
HDLC SERPL-MCNC: 105 MG/DL (ref 40–60)
HDLC SERPL: 1.8 (ref 0–5)
HGB BLD-MCNC: 15.4 G/DL (ref 13.6–17.2)
IMM GRANULOCYTES # BLD AUTO: 0.06 K/UL (ref 0–0.5)
IMM GRANULOCYTES NFR BLD AUTO: 0.4 % (ref 0–5)
LDLC SERPL CALC-MCNC: 66 MG/DL (ref 0–100)
LYMPHOCYTES # BLD: 3.06 K/UL (ref 0.5–4.6)
LYMPHOCYTES NFR BLD: 21.8 % (ref 13–44)
MCH RBC QN AUTO: 30.4 PG (ref 26.1–32.9)
MCHC RBC AUTO-ENTMCNC: 31.6 G/DL (ref 31.4–35)
MCV RBC AUTO: 96.2 FL (ref 82–102)
MONOCYTES # BLD: 0.62 K/UL (ref 0.1–1.3)
MONOCYTES NFR BLD: 4.4 % (ref 4–12)
NEUTS SEG # BLD: 9.88 K/UL (ref 1.7–8.2)
NEUTS SEG NFR BLD: 70.2 % (ref 43–78)
NRBC # BLD: 0 K/UL (ref 0–0.2)
PLATELET # BLD AUTO: 295 K/UL (ref 150–450)
PMV BLD AUTO: 9.7 FL (ref 9.4–12.3)
POTASSIUM SERPL-SCNC: 3.9 MMOL/L (ref 3.5–5.1)
PROT SERPL-MCNC: 7.2 G/DL (ref 6.3–8.2)
PSA SERPL-MCNC: 0.4 NG/ML (ref 0–4)
RBC # BLD AUTO: 5.06 M/UL (ref 4.23–5.6)
SODIUM SERPL-SCNC: 143 MMOL/L (ref 136–145)
TRIGL SERPL-MCNC: 77 MG/DL (ref 0–150)
VLDLC SERPL CALC-MCNC: 15 MG/DL (ref 6–23)
WBC # BLD AUTO: 14.1 K/UL (ref 4.3–11.1)

## 2025-09-02 PROCEDURE — 90471 IMMUNIZATION ADMIN: CPT | Performed by: FAMILY MEDICINE

## 2025-09-02 PROCEDURE — 99396 PREV VISIT EST AGE 40-64: CPT | Performed by: FAMILY MEDICINE

## 2025-09-02 PROCEDURE — 90715 TDAP VACCINE 7 YRS/> IM: CPT | Performed by: FAMILY MEDICINE

## 2025-09-02 RX ORDER — TRAZODONE HYDROCHLORIDE 100 MG/1
100 TABLET ORAL NIGHTLY
Qty: 90 TABLET | Refills: 3 | Status: SHIPPED | OUTPATIENT
Start: 2025-09-02

## 2025-09-02 RX ORDER — ATORVASTATIN CALCIUM 10 MG/1
10 TABLET, FILM COATED ORAL NIGHTLY
Qty: 90 TABLET | Refills: 3 | Status: SHIPPED | OUTPATIENT
Start: 2025-09-02 | End: 2026-09-02

## 2025-09-02 RX ORDER — ALBUTEROL SULFATE 90 UG/1
INHALANT RESPIRATORY (INHALATION)
Qty: 18 G | Refills: 2 | Status: SHIPPED | OUTPATIENT
Start: 2025-09-02

## 2025-09-02 SDOH — ECONOMIC STABILITY: FOOD INSECURITY: WITHIN THE PAST 12 MONTHS, THE FOOD YOU BOUGHT JUST DIDN'T LAST AND YOU DIDN'T HAVE MONEY TO GET MORE.: PATIENT DECLINED

## 2025-09-02 SDOH — ECONOMIC STABILITY: FOOD INSECURITY: WITHIN THE PAST 12 MONTHS, YOU WORRIED THAT YOUR FOOD WOULD RUN OUT BEFORE YOU GOT MONEY TO BUY MORE.: PATIENT DECLINED

## 2025-09-02 ASSESSMENT — ENCOUNTER SYMPTOMS
ANAL BLEEDING: 0
BLOOD IN STOOL: 0
SHORTNESS OF BREATH: 0
ABDOMINAL PAIN: 0
WHEEZING: 0
CHEST TIGHTNESS: 0

## 2025-09-04 ENCOUNTER — RESULTS FOLLOW-UP (OUTPATIENT)
Dept: FAMILY MEDICINE CLINIC | Facility: CLINIC | Age: 65
End: 2025-09-04